# Patient Record
Sex: MALE | Race: WHITE | Employment: FULL TIME | URBAN - METROPOLITAN AREA
[De-identification: names, ages, dates, MRNs, and addresses within clinical notes are randomized per-mention and may not be internally consistent; named-entity substitution may affect disease eponyms.]

---

## 2019-01-09 ENCOUNTER — HOSPITAL ENCOUNTER (EMERGENCY)
Age: 54
Discharge: HOME | End: 2019-01-09
Payer: COMMERCIAL

## 2019-01-09 ENCOUNTER — APPOINTMENT (OUTPATIENT)
Dept: GENERAL RADIOLOGY | Age: 54
End: 2019-01-09
Payer: COMMERCIAL

## 2019-01-09 ENCOUNTER — APPOINTMENT (OUTPATIENT)
Dept: CT IMAGING | Age: 54
End: 2019-01-09
Payer: COMMERCIAL

## 2019-01-09 VITALS
SYSTOLIC BLOOD PRESSURE: 146 MMHG | HEART RATE: 86 BPM | RESPIRATION RATE: 20 BRPM | TEMPERATURE: 98.9 F | DIASTOLIC BLOOD PRESSURE: 94 MMHG | WEIGHT: 221.38 LBS | BODY MASS INDEX: 31.69 KG/M2 | OXYGEN SATURATION: 96 % | HEIGHT: 70 IN

## 2019-01-09 DIAGNOSIS — K59.00 CONSTIPATION, UNSPECIFIED CONSTIPATION TYPE: Primary | ICD-10-CM

## 2019-01-09 LAB
BASOPHILS ABSOLUTE: 0.03 E9/L (ref 0–0.2)
BASOPHILS RELATIVE PERCENT: 0.6 % (ref 0–2)
CO2: 28 MMOL/L (ref 22–29)
EOSINOPHILS ABSOLUTE: 0.17 E9/L (ref 0.05–0.5)
EOSINOPHILS RELATIVE PERCENT: 3.4 % (ref 0–6)
GFR AFRICAN AMERICAN: >60
GFR NON-AFRICAN AMERICAN: >60 ML/MIN/1.73
GLUCOSE BLD-MCNC: 101 MG/DL (ref 74–99)
HCT VFR BLD CALC: 45.2 % (ref 37–54)
HEMOGLOBIN: 15.1 G/DL (ref 12.5–16.5)
IMMATURE GRANULOCYTES #: 0.01 E9/L
IMMATURE GRANULOCYTES %: 0.2 % (ref 0–5)
LYMPHOCYTES ABSOLUTE: 0.72 E9/L (ref 1.5–4)
LYMPHOCYTES RELATIVE PERCENT: 14.4 % (ref 20–42)
MCH RBC QN AUTO: 31.6 PG (ref 26–35)
MCHC RBC AUTO-ENTMCNC: 33.4 % (ref 32–34.5)
MCV RBC AUTO: 94.6 FL (ref 80–99.9)
MONOCYTES ABSOLUTE: 0.65 E9/L (ref 0.1–0.95)
MONOCYTES RELATIVE PERCENT: 13 % (ref 2–12)
NEUTROPHILS ABSOLUTE: 3.42 E9/L (ref 1.8–7.3)
NEUTROPHILS RELATIVE PERCENT: 68.4 % (ref 43–80)
PDW BLD-RTO: 12.8 FL (ref 11.5–15)
PLATELET # BLD: 176 E9/L (ref 130–450)
PMV BLD AUTO: 10.2 FL (ref 7–12)
POC ANION GAP: 13 MMOL/L (ref 7–16)
POC BUN: 10 MG/DL (ref 8–23)
POC CHLORIDE: 98 MMOL/L (ref 100–108)
POC CREATININE: 0.9 MG/DL (ref 0.7–1.2)
POC POTASSIUM: 4 MMOL/L (ref 3.5–5)
POC SODIUM: 139 MMOL/L (ref 132–146)
RBC # BLD: 4.78 E12/L (ref 3.8–5.8)
WBC # BLD: 5 E9/L (ref 4.5–11.5)

## 2019-01-09 PROCEDURE — 6360000004 HC RX CONTRAST MEDICATION: Performed by: RADIOLOGY

## 2019-01-09 PROCEDURE — 82565 ASSAY OF CREATININE: CPT

## 2019-01-09 PROCEDURE — 74018 RADEX ABDOMEN 1 VIEW: CPT

## 2019-01-09 PROCEDURE — 80051 ELECTROLYTE PANEL: CPT

## 2019-01-09 PROCEDURE — 36415 COLL VENOUS BLD VENIPUNCTURE: CPT

## 2019-01-09 PROCEDURE — 84520 ASSAY OF UREA NITROGEN: CPT

## 2019-01-09 PROCEDURE — 99284 EMERGENCY DEPT VISIT MOD MDM: CPT

## 2019-01-09 PROCEDURE — 85025 COMPLETE CBC W/AUTO DIFF WBC: CPT

## 2019-01-09 PROCEDURE — 74177 CT ABD & PELVIS W/CONTRAST: CPT

## 2019-01-09 PROCEDURE — 82947 ASSAY GLUCOSE BLOOD QUANT: CPT

## 2019-01-09 RX ORDER — DOCUSATE SODIUM 100 MG/1
100 CAPSULE, LIQUID FILLED ORAL 2 TIMES DAILY
Qty: 28 CAPSULE | Refills: 0 | Status: SHIPPED | OUTPATIENT
Start: 2019-01-09 | End: 2019-01-23

## 2019-01-09 RX ORDER — MAGNESIUM CARB/ALUMINUM HYDROX 105-160MG
TABLET,CHEWABLE ORAL
Qty: 1 BOTTLE | Refills: 0 | Status: ON HOLD | OUTPATIENT
Start: 2019-01-09 | End: 2021-01-20 | Stop reason: HOSPADM

## 2019-01-09 RX ADMIN — IOHEXOL 50 ML: 240 INJECTION, SOLUTION INTRATHECAL; INTRAVASCULAR; INTRAVENOUS; ORAL at 17:12

## 2019-01-09 RX ADMIN — IOPAMIDOL 80 ML: 755 INJECTION, SOLUTION INTRAVENOUS at 17:12

## 2019-01-09 ASSESSMENT — PAIN DESCRIPTION - ORIENTATION: ORIENTATION: MID

## 2021-01-17 ENCOUNTER — APPOINTMENT (OUTPATIENT)
Dept: CT IMAGING | Age: 56
DRG: 177 | End: 2021-01-17
Payer: COMMERCIAL

## 2021-01-17 ENCOUNTER — HOSPITAL ENCOUNTER (INPATIENT)
Age: 56
LOS: 3 days | Discharge: HOME | DRG: 177 | End: 2021-01-20
Attending: EMERGENCY MEDICINE | Admitting: INTERNAL MEDICINE
Payer: COMMERCIAL

## 2021-01-17 DIAGNOSIS — J96.01 ACUTE HYPOXEMIC RESPIRATORY FAILURE DUE TO COVID-19 (HCC): Primary | ICD-10-CM

## 2021-01-17 DIAGNOSIS — U07.1 ACUTE HYPOXEMIC RESPIRATORY FAILURE DUE TO COVID-19 (HCC): Primary | ICD-10-CM

## 2021-01-17 PROBLEM — J12.82 PNEUMONIA DUE TO COVID-19 VIRUS: Status: ACTIVE | Noted: 2021-01-17

## 2021-01-17 LAB
ALBUMIN SERPL-MCNC: 4.1 G/DL (ref 3.5–5.2)
ALP BLD-CCNC: 62 U/L (ref 40–129)
ALT SERPL-CCNC: 121 U/L (ref 0–40)
ANION GAP SERPL CALCULATED.3IONS-SCNC: 9 MMOL/L (ref 7–16)
APTT: 30.9 SEC (ref 24.5–35.1)
AST SERPL-CCNC: 109 U/L (ref 0–39)
B.E.: -2.1 MMOL/L (ref -3–3)
BASOPHILS ABSOLUTE: 0.01 E9/L (ref 0–0.2)
BASOPHILS RELATIVE PERCENT: 0.2 % (ref 0–2)
BILIRUB SERPL-MCNC: 0.4 MG/DL (ref 0–1.2)
BUN BLDV-MCNC: 10 MG/DL (ref 6–20)
CALCIUM SERPL-MCNC: 8.3 MG/DL (ref 8.6–10.2)
CHLORIDE BLD-SCNC: 98 MMOL/L (ref 98–107)
CO2: 26 MMOL/L (ref 22–29)
COHB: 0.2 % (ref 0–1.5)
CREAT SERPL-MCNC: 0.9 MG/DL (ref 0.7–1.2)
CRITICAL: NORMAL
DATE ANALYZED: NORMAL
DATE OF COLLECTION: NORMAL
EOSINOPHILS ABSOLUTE: 0.01 E9/L (ref 0.05–0.5)
EOSINOPHILS RELATIVE PERCENT: 0.2 % (ref 0–6)
GFR AFRICAN AMERICAN: >60
GFR NON-AFRICAN AMERICAN: >60 ML/MIN/1.73
GLUCOSE BLD-MCNC: 97 MG/DL (ref 74–99)
HCO3: 22.8 MMOL/L (ref 22–26)
HCT VFR BLD CALC: 46 % (ref 37–54)
HEMOGLOBIN: 15.5 G/DL (ref 12.5–16.5)
HHB: 3 % (ref 0–5)
IMMATURE GRANULOCYTES #: 0 E9/L
IMMATURE GRANULOCYTES %: 0 % (ref 0–5)
INR BLD: 0.9
LAB: NORMAL
LACTIC ACID, SEPSIS: 0.7 MMOL/L (ref 0.5–1.9)
LYMPHOCYTES ABSOLUTE: 1.1 E9/L (ref 1.5–4)
LYMPHOCYTES RELATIVE PERCENT: 27.4 % (ref 20–42)
Lab: NORMAL
MCH RBC QN AUTO: 31.9 PG (ref 26–35)
MCHC RBC AUTO-ENTMCNC: 33.7 % (ref 32–34.5)
MCV RBC AUTO: 94.7 FL (ref 80–99.9)
METHB: 0.4 % (ref 0–1.5)
MODE: NORMAL
MONOCYTES ABSOLUTE: 0.49 E9/L (ref 0.1–0.95)
MONOCYTES RELATIVE PERCENT: 12.2 % (ref 2–12)
NEUTROPHILS ABSOLUTE: 2.41 E9/L (ref 1.8–7.3)
NEUTROPHILS RELATIVE PERCENT: 60 % (ref 43–80)
O2 CONTENT: 20.1 ML/DL
O2 SATURATION: 97 % (ref 92–98.5)
O2HB: 96.4 % (ref 94–97)
OPERATOR ID: NORMAL
PATIENT TEMP: 37 C
PCO2: 39.6 MMHG (ref 35–45)
PDW BLD-RTO: 13 FL (ref 11.5–15)
PH BLOOD GAS: 7.38 (ref 7.35–7.45)
PLATELET # BLD: 148 E9/L (ref 130–450)
PMV BLD AUTO: 10.2 FL (ref 7–12)
PO2: 99.8 MMHG (ref 75–100)
POTASSIUM REFLEX MAGNESIUM: 3.9 MMOL/L (ref 3.5–5)
PRO-BNP: 19 PG/ML (ref 0–125)
PROTHROMBIN TIME: 10.7 SEC (ref 9.3–12.4)
RBC # BLD: 4.86 E12/L (ref 3.8–5.8)
SARS-COV-2, NAAT: DETECTED
SODIUM BLD-SCNC: 133 MMOL/L (ref 132–146)
SOURCE, BLOOD GAS: NORMAL
THB: 14.8 G/DL (ref 11.5–16.5)
TIME ANALYZED: 1950
TOTAL PROTEIN: 7.4 G/DL (ref 6.4–8.3)
TROPONIN: <0.01 NG/ML (ref 0–0.03)
WBC # BLD: 4 E9/L (ref 4.5–11.5)

## 2021-01-17 PROCEDURE — 99285 EMERGENCY DEPT VISIT HI MDM: CPT

## 2021-01-17 PROCEDURE — 87040 BLOOD CULTURE FOR BACTERIA: CPT

## 2021-01-17 PROCEDURE — 71275 CT ANGIOGRAPHY CHEST: CPT

## 2021-01-17 PROCEDURE — 2580000003 HC RX 258: Performed by: INTERNAL MEDICINE

## 2021-01-17 PROCEDURE — 85025 COMPLETE CBC W/AUTO DIFF WBC: CPT

## 2021-01-17 PROCEDURE — 83605 ASSAY OF LACTIC ACID: CPT

## 2021-01-17 PROCEDURE — 2500000003 HC RX 250 WO HCPCS: Performed by: EMERGENCY MEDICINE

## 2021-01-17 PROCEDURE — 84484 ASSAY OF TROPONIN QUANT: CPT

## 2021-01-17 PROCEDURE — 82805 BLOOD GASES W/O2 SATURATION: CPT

## 2021-01-17 PROCEDURE — 80053 COMPREHEN METABOLIC PANEL: CPT

## 2021-01-17 PROCEDURE — 6360000002 HC RX W HCPCS: Performed by: INTERNAL MEDICINE

## 2021-01-17 PROCEDURE — 6360000004 HC RX CONTRAST MEDICATION: Performed by: RADIOLOGY

## 2021-01-17 PROCEDURE — 2580000003 HC RX 258: Performed by: EMERGENCY MEDICINE

## 2021-01-17 PROCEDURE — 6360000002 HC RX W HCPCS: Performed by: EMERGENCY MEDICINE

## 2021-01-17 PROCEDURE — 6370000000 HC RX 637 (ALT 250 FOR IP): Performed by: EMERGENCY MEDICINE

## 2021-01-17 PROCEDURE — 99223 1ST HOSP IP/OBS HIGH 75: CPT | Performed by: INTERNAL MEDICINE

## 2021-01-17 PROCEDURE — 36415 COLL VENOUS BLD VENIPUNCTURE: CPT

## 2021-01-17 PROCEDURE — 85610 PROTHROMBIN TIME: CPT

## 2021-01-17 PROCEDURE — 83880 ASSAY OF NATRIURETIC PEPTIDE: CPT

## 2021-01-17 PROCEDURE — 93005 ELECTROCARDIOGRAM TRACING: CPT | Performed by: EMERGENCY MEDICINE

## 2021-01-17 PROCEDURE — 85730 THROMBOPLASTIN TIME PARTIAL: CPT

## 2021-01-17 PROCEDURE — 6370000000 HC RX 637 (ALT 250 FOR IP): Performed by: INTERNAL MEDICINE

## 2021-01-17 PROCEDURE — U0002 COVID-19 LAB TEST NON-CDC: HCPCS

## 2021-01-17 PROCEDURE — XW033E5 INTRODUCTION OF REMDESIVIR ANTI-INFECTIVE INTO PERIPHERAL VEIN, PERCUTANEOUS APPROACH, NEW TECHNOLOGY GROUP 5: ICD-10-PCS | Performed by: INTERNAL MEDICINE

## 2021-01-17 PROCEDURE — 2060000000 HC ICU INTERMEDIATE R&B

## 2021-01-17 PROCEDURE — 84145 PROCALCITONIN (PCT): CPT

## 2021-01-17 RX ORDER — SODIUM CHLORIDE 0.9 % (FLUSH) 0.9 %
10 SYRINGE (ML) INJECTION EVERY 12 HOURS SCHEDULED
Status: DISCONTINUED | OUTPATIENT
Start: 2021-01-17 | End: 2021-01-17 | Stop reason: SDUPTHER

## 2021-01-17 RX ORDER — ACETAMINOPHEN 325 MG/1
650 TABLET ORAL EVERY 6 HOURS PRN
Status: DISCONTINUED | OUTPATIENT
Start: 2021-01-17 | End: 2021-01-20 | Stop reason: HOSPADM

## 2021-01-17 RX ORDER — 0.9 % SODIUM CHLORIDE 0.9 %
30 INTRAVENOUS SOLUTION INTRAVENOUS PRN
Status: DISCONTINUED | OUTPATIENT
Start: 2021-01-17 | End: 2021-01-20 | Stop reason: HOSPADM

## 2021-01-17 RX ORDER — FAMOTIDINE 20 MG/1
20 TABLET, FILM COATED ORAL 2 TIMES DAILY
Status: DISCONTINUED | OUTPATIENT
Start: 2021-01-17 | End: 2021-01-20 | Stop reason: HOSPADM

## 2021-01-17 RX ORDER — SODIUM CHLORIDE 0.9 % (FLUSH) 0.9 %
10 SYRINGE (ML) INJECTION PRN
Status: DISCONTINUED | OUTPATIENT
Start: 2021-01-17 | End: 2021-01-17 | Stop reason: SDUPTHER

## 2021-01-17 RX ORDER — 0.9 % SODIUM CHLORIDE 0.9 %
1000 INTRAVENOUS SOLUTION INTRAVENOUS ONCE
Status: COMPLETED | OUTPATIENT
Start: 2021-01-17 | End: 2021-01-17

## 2021-01-17 RX ORDER — ACETAMINOPHEN 500 MG
1000 TABLET ORAL ONCE
Status: COMPLETED | OUTPATIENT
Start: 2021-01-17 | End: 2021-01-17

## 2021-01-17 RX ORDER — METHYLPREDNISOLONE SODIUM SUCCINATE 125 MG/2ML
125 INJECTION, POWDER, LYOPHILIZED, FOR SOLUTION INTRAMUSCULAR; INTRAVENOUS
Status: COMPLETED | OUTPATIENT
Start: 2021-01-17 | End: 2021-01-17

## 2021-01-17 RX ORDER — DEXAMETHASONE SODIUM PHOSPHATE 10 MG/ML
6 INJECTION, SOLUTION INTRAMUSCULAR; INTRAVENOUS ONCE
Status: COMPLETED | OUTPATIENT
Start: 2021-01-17 | End: 2021-01-17

## 2021-01-17 RX ORDER — SODIUM CHLORIDE 0.9 % (FLUSH) 0.9 %
10 SYRINGE (ML) INJECTION EVERY 12 HOURS SCHEDULED
Status: DISCONTINUED | OUTPATIENT
Start: 2021-01-17 | End: 2021-01-20 | Stop reason: HOSPADM

## 2021-01-17 RX ORDER — PROMETHAZINE HYDROCHLORIDE 25 MG/1
12.5 TABLET ORAL EVERY 6 HOURS PRN
Status: DISCONTINUED | OUTPATIENT
Start: 2021-01-17 | End: 2021-01-20 | Stop reason: HOSPADM

## 2021-01-17 RX ORDER — ACETAMINOPHEN 325 MG/1
650 TABLET ORAL ONCE
Status: COMPLETED | OUTPATIENT
Start: 2021-01-17 | End: 2021-01-17

## 2021-01-17 RX ORDER — EPINEPHRINE 1 MG/ML
0.3 INJECTION, SOLUTION, CONCENTRATE INTRAVENOUS
Status: ACTIVE | OUTPATIENT
Start: 2021-01-17 | End: 2021-01-17

## 2021-01-17 RX ORDER — POLYETHYLENE GLYCOL 3350 17 G/17G
17 POWDER, FOR SOLUTION ORAL DAILY PRN
Status: DISCONTINUED | OUTPATIENT
Start: 2021-01-17 | End: 2021-01-20 | Stop reason: HOSPADM

## 2021-01-17 RX ORDER — ACETAMINOPHEN 650 MG/1
650 SUPPOSITORY RECTAL EVERY 6 HOURS PRN
Status: DISCONTINUED | OUTPATIENT
Start: 2021-01-17 | End: 2021-01-20 | Stop reason: HOSPADM

## 2021-01-17 RX ORDER — DIPHENHYDRAMINE HYDROCHLORIDE 50 MG/ML
25 INJECTION INTRAMUSCULAR; INTRAVENOUS ONCE
Status: COMPLETED | OUTPATIENT
Start: 2021-01-17 | End: 2021-01-17

## 2021-01-17 RX ORDER — SODIUM CHLORIDE 0.9 % (FLUSH) 0.9 %
10 SYRINGE (ML) INJECTION PRN
Status: DISCONTINUED | OUTPATIENT
Start: 2021-01-17 | End: 2021-01-20 | Stop reason: HOSPADM

## 2021-01-17 RX ORDER — ONDANSETRON 2 MG/ML
4 INJECTION INTRAMUSCULAR; INTRAVENOUS EVERY 6 HOURS PRN
Status: DISCONTINUED | OUTPATIENT
Start: 2021-01-17 | End: 2021-01-20 | Stop reason: HOSPADM

## 2021-01-17 RX ADMIN — Medication 10 ML: at 23:46

## 2021-01-17 RX ADMIN — FAMOTIDINE 20 MG: 20 TABLET, FILM COATED ORAL at 23:30

## 2021-01-17 RX ADMIN — DEXAMETHASONE SODIUM PHOSPHATE 6 MG: 10 INJECTION, SOLUTION INTRAMUSCULAR; INTRAVENOUS at 20:34

## 2021-01-17 RX ADMIN — SODIUM CHLORIDE 1000 ML: 9 INJECTION, SOLUTION INTRAVENOUS at 18:04

## 2021-01-17 RX ADMIN — DIPHENHYDRAMINE HYDROCHLORIDE 25 MG: 50 INJECTION INTRAMUSCULAR; INTRAVENOUS at 23:26

## 2021-01-17 RX ADMIN — ACETAMINOPHEN 1000 MG: 500 TABLET, FILM COATED ORAL at 18:04

## 2021-01-17 RX ADMIN — REMDESIVIR 200 MG: 100 INJECTION, POWDER, LYOPHILIZED, FOR SOLUTION INTRAVENOUS at 20:34

## 2021-01-17 RX ADMIN — POLYETHYLENE GLYCOL 3350 17 G: 17 POWDER, FOR SOLUTION ORAL at 23:52

## 2021-01-17 RX ADMIN — TOCILIZUMAB 800 MG: 20 INJECTION, SOLUTION, CONCENTRATE INTRAVENOUS at 23:45

## 2021-01-17 RX ADMIN — ACETAMINOPHEN 650 MG: 325 TABLET, FILM COATED ORAL at 23:26

## 2021-01-17 RX ADMIN — IOPAMIDOL 75 ML: 755 INJECTION, SOLUTION INTRAVENOUS at 18:24

## 2021-01-17 RX ADMIN — METHYLPREDNISOLONE SODIUM SUCCINATE 125 MG: 125 INJECTION, POWDER, FOR SOLUTION INTRAMUSCULAR; INTRAVENOUS at 23:30

## 2021-01-17 ASSESSMENT — ENCOUNTER SYMPTOMS
WHEEZING: 0
BACK PAIN: 0
BLOOD IN STOOL: 0
DIARRHEA: 0
EYE REDNESS: 0
SHORTNESS OF BREATH: 1
VOMITING: 0
CONSTIPATION: 0
SORE THROAT: 0
COUGH: 1
RHINORRHEA: 0
ABDOMINAL PAIN: 0
NAUSEA: 0
ABDOMINAL DISTENTION: 0

## 2021-01-17 ASSESSMENT — PAIN SCALES - GENERAL: PAINLEVEL_OUTOF10: 0

## 2021-01-18 LAB
ALBUMIN SERPL-MCNC: 3.9 G/DL (ref 3.5–5.2)
ALP BLD-CCNC: 58 U/L (ref 40–129)
ALT SERPL-CCNC: 109 U/L (ref 0–40)
ANION GAP SERPL CALCULATED.3IONS-SCNC: 10 MMOL/L (ref 7–16)
AST SERPL-CCNC: 92 U/L (ref 0–39)
BASOPHILS ABSOLUTE: 0 E9/L (ref 0–0.2)
BASOPHILS RELATIVE PERCENT: 0 % (ref 0–2)
BILIRUB SERPL-MCNC: 0.3 MG/DL (ref 0–1.2)
BUN BLDV-MCNC: 12 MG/DL (ref 6–20)
CALCIUM SERPL-MCNC: 8.3 MG/DL (ref 8.6–10.2)
CHLORIDE BLD-SCNC: 103 MMOL/L (ref 98–107)
CO2: 22 MMOL/L (ref 22–29)
CREAT SERPL-MCNC: 0.7 MG/DL (ref 0.7–1.2)
EKG ATRIAL RATE: 81 BPM
EKG P AXIS: 53 DEGREES
EKG P-R INTERVAL: 146 MS
EKG Q-T INTERVAL: 358 MS
EKG QRS DURATION: 94 MS
EKG QTC CALCULATION (BAZETT): 415 MS
EKG R AXIS: 28 DEGREES
EKG T AXIS: 45 DEGREES
EKG VENTRICULAR RATE: 81 BPM
EOSINOPHILS ABSOLUTE: 0 E9/L (ref 0.05–0.5)
EOSINOPHILS RELATIVE PERCENT: 0 % (ref 0–6)
GFR AFRICAN AMERICAN: >60
GFR NON-AFRICAN AMERICAN: >60 ML/MIN/1.73
GLUCOSE BLD-MCNC: 158 MG/DL (ref 74–99)
HCT VFR BLD CALC: 46.5 % (ref 37–54)
HEMOGLOBIN: 15.7 G/DL (ref 12.5–16.5)
IMMATURE GRANULOCYTES #: 0 E9/L
IMMATURE GRANULOCYTES %: 0 % (ref 0–5)
LACTIC ACID, SEPSIS: 1.1 MMOL/L (ref 0.5–1.9)
LYMPHOCYTES ABSOLUTE: 0.6 E9/L (ref 1.5–4)
LYMPHOCYTES RELATIVE PERCENT: 23.7 % (ref 20–42)
MCH RBC QN AUTO: 31.6 PG (ref 26–35)
MCHC RBC AUTO-ENTMCNC: 33.8 % (ref 32–34.5)
MCV RBC AUTO: 93.6 FL (ref 80–99.9)
MONOCYTES ABSOLUTE: 0.11 E9/L (ref 0.1–0.95)
MONOCYTES RELATIVE PERCENT: 4.3 % (ref 2–12)
NEUTROPHILS ABSOLUTE: 1.82 E9/L (ref 1.8–7.3)
NEUTROPHILS RELATIVE PERCENT: 72 % (ref 43–80)
PDW BLD-RTO: 12.9 FL (ref 11.5–15)
PLATELET # BLD: 157 E9/L (ref 130–450)
PMV BLD AUTO: 10.8 FL (ref 7–12)
POTASSIUM REFLEX MAGNESIUM: 4.4 MMOL/L (ref 3.5–5)
RBC # BLD: 4.97 E12/L (ref 3.8–5.8)
SARS-COV-2 ANTIBODY, TOTAL: NORMAL
SODIUM BLD-SCNC: 135 MMOL/L (ref 132–146)
TOTAL PROTEIN: 7.2 G/DL (ref 6.4–8.3)
WBC # BLD: 2.5 E9/L (ref 4.5–11.5)

## 2021-01-18 PROCEDURE — 93010 ELECTROCARDIOGRAM REPORT: CPT | Performed by: INTERNAL MEDICINE

## 2021-01-18 PROCEDURE — 6370000000 HC RX 637 (ALT 250 FOR IP): Performed by: INTERNAL MEDICINE

## 2021-01-18 PROCEDURE — 2500000003 HC RX 250 WO HCPCS: Performed by: INTERNAL MEDICINE

## 2021-01-18 PROCEDURE — 2700000000 HC OXYGEN THERAPY PER DAY

## 2021-01-18 PROCEDURE — 83605 ASSAY OF LACTIC ACID: CPT

## 2021-01-18 PROCEDURE — 6360000002 HC RX W HCPCS: Performed by: INTERNAL MEDICINE

## 2021-01-18 PROCEDURE — 80053 COMPREHEN METABOLIC PANEL: CPT

## 2021-01-18 PROCEDURE — 36415 COLL VENOUS BLD VENIPUNCTURE: CPT

## 2021-01-18 PROCEDURE — 86769 SARS-COV-2 COVID-19 ANTIBODY: CPT

## 2021-01-18 PROCEDURE — 2060000000 HC ICU INTERMEDIATE R&B

## 2021-01-18 PROCEDURE — 85025 COMPLETE CBC W/AUTO DIFF WBC: CPT

## 2021-01-18 PROCEDURE — 2580000003 HC RX 258: Performed by: INTERNAL MEDICINE

## 2021-01-18 PROCEDURE — 99232 SBSQ HOSP IP/OBS MODERATE 35: CPT | Performed by: INTERNAL MEDICINE

## 2021-01-18 PROCEDURE — 6370000000 HC RX 637 (ALT 250 FOR IP): Performed by: SPECIALIST

## 2021-01-18 RX ORDER — LANOLIN ALCOHOL/MO/W.PET/CERES
50 CREAM (GRAM) TOPICAL DAILY
Status: DISCONTINUED | OUTPATIENT
Start: 2021-01-18 | End: 2021-01-20 | Stop reason: HOSPADM

## 2021-01-18 RX ORDER — ASCORBIC ACID 500 MG
1000 TABLET ORAL DAILY
Status: DISCONTINUED | OUTPATIENT
Start: 2021-01-18 | End: 2021-01-20 | Stop reason: HOSPADM

## 2021-01-18 RX ORDER — VITAMIN B COMPLEX
3000 TABLET ORAL DAILY
Status: DISCONTINUED | OUTPATIENT
Start: 2021-01-18 | End: 2021-01-20 | Stop reason: HOSPADM

## 2021-01-18 RX ORDER — ZINC SULFATE 50(220)MG
50 CAPSULE ORAL DAILY
Status: DISCONTINUED | OUTPATIENT
Start: 2021-01-18 | End: 2021-01-20 | Stop reason: HOSPADM

## 2021-01-18 RX ORDER — GAUZE BANDAGE 2" X 2"
100 BANDAGE TOPICAL DAILY
Status: DISCONTINUED | OUTPATIENT
Start: 2021-01-18 | End: 2021-01-20 | Stop reason: HOSPADM

## 2021-01-18 RX ORDER — MECOBALAMIN 5000 MCG
5 TABLET,DISINTEGRATING ORAL NIGHTLY
Status: DISCONTINUED | OUTPATIENT
Start: 2021-01-18 | End: 2021-01-20 | Stop reason: HOSPADM

## 2021-01-18 RX ADMIN — ENOXAPARIN SODIUM 40 MG: 40 INJECTION SUBCUTANEOUS at 08:36

## 2021-01-18 RX ADMIN — DEXAMETHASONE 6 MG: 4 TABLET ORAL at 08:36

## 2021-01-18 RX ADMIN — REMDESIVIR 100 MG: 100 INJECTION, POWDER, LYOPHILIZED, FOR SOLUTION INTRAVENOUS at 20:09

## 2021-01-18 RX ADMIN — PYRIDOXINE HCL TAB 50 MG 50 MG: 50 TAB at 08:37

## 2021-01-18 RX ADMIN — ZINC SULFATE 220 MG (50 MG) CAPSULE 50 MG: CAPSULE at 08:36

## 2021-01-18 RX ADMIN — Medication 1000 MG: at 08:36

## 2021-01-18 RX ADMIN — Medication 10 ML: at 08:36

## 2021-01-18 RX ADMIN — FAMOTIDINE 20 MG: 20 TABLET, FILM COATED ORAL at 21:18

## 2021-01-18 RX ADMIN — THIAMINE HCL TAB 100 MG 100 MG: 100 TAB at 08:36

## 2021-01-18 RX ADMIN — POLYETHYLENE GLYCOL 3350 17 G: 17 POWDER, FOR SOLUTION ORAL at 08:36

## 2021-01-18 RX ADMIN — Medication 5 MG: at 21:18

## 2021-01-18 RX ADMIN — SODIUM CHLORIDE 30 ML: 9 INJECTION, SOLUTION INTRAVENOUS at 20:50

## 2021-01-18 RX ADMIN — Medication 3000 UNITS: at 08:36

## 2021-01-18 RX ADMIN — SODIUM CHLORIDE, PRESERVATIVE FREE 10 ML: 5 INJECTION INTRAVENOUS at 21:18

## 2021-01-18 RX ADMIN — Medication 10 ML: at 20:09

## 2021-01-18 RX ADMIN — FAMOTIDINE 20 MG: 20 TABLET, FILM COATED ORAL at 08:36

## 2021-01-18 ASSESSMENT — PAIN SCALES - GENERAL: PAINLEVEL_OUTOF10: 0

## 2021-01-18 NOTE — ACP (ADVANCE CARE PLANNING)
Advance Care Planning     Advance Care Planning Activator (Inpatient)  Conversation Note      Date of ACP Conversation: 1/18/2021  Conversation Conducted with: Patient  ACP Activator: Tianna Brownlee RN    *When Decision Maker makes decisions on behalf of the incapacitated patient: Decision Maker is asked to consider and make decisions based on patient values, known preferences, or best interests. Health Care Decision Maker:     Current Designated Health Care Decision Maker:   Primary Decision Maker: [facility] CHILO - Domestic Partner - [phone]    Secondary Decision Maker: Aicha Bocanegra - Brother/Sister - [phone]    Note: If the relationship of these Decision-Makers to the patient does NOT follow your state's Next of Kin hierarchy, recommend that patient complete ACP document that meets state-specific requirements to allow them to act on the patient's behalf when appropriate. Care Preferences    Ventilation: \"If you were in your present state of health and suddenly became very ill and were unable to breathe on your own, what would your preference be about the use of a ventilator (breathing machine) if it were available to you? \"      Would the patient desire the use of ventilator (breathing machine)?: yes    \"If your health worsens and it becomes clear that your chance of recovery is unlikely, what would your preference be about the use of a ventilator (breathing machine) if it were available to you? \"     Would the patient desire the use of ventilator (breathing machine)?: No      Resuscitation  \"CPR works best to restart the heart when there is a sudden event, like a heart attack, in someone who is otherwise healthy. Unfortunately, CPR does not typically restart the heart for people who have serious health conditions or who are very sick. \"    \"In the event your heart stopped as a result of an underlying serious health condition, would you want attempts to be made to restart your heart (answer \"yes\" for attempt to resuscitate) or would you prefer a natural death (answer \"no\" for do not attempt to resuscitate)? \" yes - Mr. Gonzáles November states \"one attempt\"      NOTE: If the patient has a valid advance directive AND now provides care preference(s) that are inconsistent with that prior directive, advise the patient to consider either: creating a new advance directive that complies with state-specific requirements; or, if that is not possible, orally revoking that prior directive in accordance with state-specific requirements, which must be documented in the EHR. [] Yes   [x] No   Educated Patient / Doc Gist regarding differences between Advance Directives and portable DNR orders.     Length of ACP Conversation in minutes:      Conversation Outcomes:  [x] ACP discussion completed  [] Existing advance directive reviewed with patient; no changes to patient's previously recorded wishes  [] New Advance Directive completed  [] Portable Do Not Rescitate prepared for Provider review and signature  [] POLST/POST/MOLST/MOST prepared for Provider review and signature      Follow-up plan:    [] Schedule follow-up conversation to continue planning  [] Referred individual to Provider for additional questions/concerns   [] Advised patient/agent/surrogate to review completed ACP document and update if needed with changes in condition, patient preferences or care setting    [x] This note routed to one or more involved healthcare providers

## 2021-01-18 NOTE — PROGRESS NOTES
Emergency contact Shakira Bruce ([facility]) called and updated on patient status and care at this time. All questions addressed.     Antonieta Diaz RN

## 2021-01-18 NOTE — ED PROVIDER NOTES
ROLA Crawford is a 54 y.o. male with a PMHx significant for no chronic medical problems who presents with several days of worsening cough, fevers, chest pain and shortness of breath. The patient states that his fever has been as high as 101.5 °F.  He states that he is taken ibuprofen for the fevers to keep it under control. He states that his cough has been dry. He describes the chest pain as a generalized pressure sensation overlying the mid to left chest.  He states that it does not radiate anywhere. He states that the shortness of breath has significantly worsened over the last 24 hours specifically. He states that any activity makes it worse. He also states that lying flat makes it worse. Has not noticed anything other than rest to make his symptoms better. Symptoms are moderate in severity and have been persistent. The patient denies recent trauma, HA, dizziness, vision changes, congestion, rhinorrhea, neck pain, palpitations, hx of MI, hx of blood clots, LE edema, hemoptysis, wheezing, abdominal pain, N/V/D/C, hematochezia, melena, dysuria, hematuria, generalized weakness and paresthesias. The patient is currently taking no blood thinners. Tobacco Hx:   reports that he has never smoked. He does not have any smokeless tobacco history on file. Alcohol Hx:   reports current alcohol use of about 10.0 standard drinks of alcohol per week. Illicit Drug Hx:  Reports no history of illicit drug use. The history is provided by the patient. Last Tetanus (if applicable): N/A    Review of Systems   Constitutional: Positive for chills, fatigue and fever. Negative for diaphoresis. HENT: Negative for congestion, rhinorrhea and sore throat. Eyes: Negative for redness. Respiratory: Positive for cough (Nonproductive) and shortness of breath. Negative for wheezing. Cardiovascular: Positive for chest pain. Negative for palpitations and leg swelling.    Gastrointestinal: Negative for abdominal distention, abdominal pain, blood in stool, constipation, diarrhea, nausea and vomiting. Genitourinary: Negative for difficulty urinating, dysuria, flank pain, frequency and hematuria. Musculoskeletal: Negative for arthralgias, back pain, myalgias and neck pain. Skin: Negative for rash and wound. Neurological: Negative for dizziness, syncope, speech difficulty, weakness, light-headedness, numbness and headaches. Physical Exam  Vitals signs and nursing note reviewed. Constitutional:       General: He is awake. He is not in acute distress. Appearance: He is not diaphoretic. HENT:      Head: Normocephalic and atraumatic. Right Ear: External ear normal.      Left Ear: External ear normal.      Nose: Nose normal. No congestion or rhinorrhea. Mouth/Throat:      Mouth: Mucous membranes are dry. Pharynx: Oropharynx is clear. Eyes:      General: No scleral icterus. Right eye: No discharge. Left eye: No discharge. Extraocular Movements: Extraocular movements intact. Conjunctiva/sclera: Conjunctivae normal.   Neck:      Musculoskeletal: Normal range of motion and neck supple. No neck rigidity or muscular tenderness. Cardiovascular:      Rate and Rhythm: Regular rhythm. Tachycardia present. Heart sounds: Normal heart sounds. No murmur. No friction rub. No gallop. Comments: Upper extremity and lower extremity distal pulses intact bilaterally +2/4  Pulmonary:      Breath sounds: Rales present. No wheezing or rhonchi. Comments: Patient is tachypneic. Decreased air movement noted throughout. Bilateral crackles on auscultation. Chest:      Chest wall: Tenderness (To mild palpation) present. Abdominal:      General: Bowel sounds are normal. There is no distension. Palpations: Abdomen is soft. Tenderness: There is no abdominal tenderness. There is no guarding or rebound. Musculoskeletal: Normal range of motion. General: No tenderness or deformity. Right lower leg: No edema. Left lower leg: No edema. Lymphadenopathy:      Cervical: No cervical adenopathy. Skin:     General: Skin is warm and dry. Capillary Refill: Capillary refill takes less than 2 seconds. Findings: No erythema or rash. Neurological:      General: No focal deficit present. Mental Status: He is alert and oriented to person, place, and time. Sensory: No sensory deficit. Motor: No weakness. Coordination: Coordination normal.          --------------------------------------------- PAST HISTORY ---------------------------------------------  Past Medical History:  has no past medical history on file. Past Surgical History:  has a past surgical history that includes eye surgery. Social History:  reports that he has never smoked. He does not have any smokeless tobacco history on file. He reports current alcohol use of about 10.0 standard drinks of alcohol per week. He reports that he does not use drugs. Family History: family history is not on file. Home Meds: Not in a hospital admission. The patients home medications have been reviewed. Allergies: Patient has no known allergies. ------------------------- NURSING NOTES AND VITALS REVIEWED ---------------------------  Date / Time Roomed:  1/17/2021  5:23 PM  ED Bed Assignment:  04/04    The nursing notes within the ED encounter and vital signs as below have been reviewed. /71   Pulse 69   Temp 98.8 °F (37.1 °C)   Resp 22   Ht 5' 10\" (1.778 m)   Wt 230 lb (104.3 kg)   SpO2 98%   BMI 33.00 kg/m²   -------------------------------------------------- RESULTS / INTERVENTIONS -------------------------------------------------  All laboratory and radiology tests have been reviewed by this physician.     LABS:  Results for orders placed or performed during the hospital encounter of 01/17/21   CBC Auto Differential   Result Value Ref Range    WBC 4.0 (L) 4.5 - 11.5 E9/L    RBC 4.86 3.80 - 5.80 E12/L    Hemoglobin 15.5 12.5 - 16.5 g/dL    Hematocrit 46.0 37.0 - 54.0 %    MCV 94.7 80.0 - 99.9 fL    MCH 31.9 26.0 - 35.0 pg    MCHC 33.7 32.0 - 34.5 %    RDW 13.0 11.5 - 15.0 fL    Platelets 857 016 - 733 E9/L    MPV 10.2 7.0 - 12.0 fL    Neutrophils % 60.0 43.0 - 80.0 %    Immature Granulocytes % 0.0 0.0 - 5.0 %    Lymphocytes % 27.4 20.0 - 42.0 %    Monocytes % 12.2 (H) 2.0 - 12.0 %    Eosinophils % 0.2 0.0 - 6.0 %    Basophils % 0.2 0.0 - 2.0 %    Neutrophils Absolute 2.41 1.80 - 7.30 E9/L    Immature Granulocytes # 0.00 E9/L    Lymphocytes Absolute 1.10 (L) 1.50 - 4.00 E9/L    Monocytes Absolute 0.49 0.10 - 0.95 E9/L    Eosinophils Absolute 0.01 (L) 0.05 - 0.50 E9/L    Basophils Absolute 0.01 0.00 - 0.20 E9/L   Comprehensive Metabolic Panel w/ Reflex to MG   Result Value Ref Range    Sodium 133 132 - 146 mmol/L    Potassium reflex Magnesium 3.9 3.5 - 5.0 mmol/L    Chloride 98 98 - 107 mmol/L    CO2 26 22 - 29 mmol/L    Anion Gap 9 7 - 16 mmol/L    Glucose 97 74 - 99 mg/dL    BUN 10 6 - 20 mg/dL    CREATININE 0.9 0.7 - 1.2 mg/dL    GFR Non-African American >60 >=60 mL/min/1.73    GFR African American >60     Calcium 8.3 (L) 8.6 - 10.2 mg/dL    Total Protein 7.4 6.4 - 8.3 g/dL    Alb 4.1 3.5 - 5.2 g/dL    Total Bilirubin 0.4 0.0 - 1.2 mg/dL    Alkaline Phosphatase 62 40 - 129 U/L     (H) 0 - 40 U/L     (H) 0 - 39 U/L   Troponin   Result Value Ref Range    Troponin <0.01 0.00 - 0.03 ng/mL   Brain Natriuretic Peptide   Result Value Ref Range    Pro-BNP 19 0 - 125 pg/mL   Lactate, Sepsis   Result Value Ref Range    Lactic Acid, Sepsis 0.7 0.5 - 1.9 mmol/L   Protime-INR   Result Value Ref Range    Protime 10.7 9.3 - 12.4 sec    INR 0.9    APTT   Result Value Ref Range    aPTT 30.9 24.5 - 35.1 sec   COVID-19   Result Value Ref Range    SARS-CoV-2, NAAT DETECTED (A) Not Detected   Blood Gas, Arterial   Result Value Ref Range    Date Analyzed [id number] Time Analyzed 1950     Source: Blood Arterial     pH, Blood Gas 7.378 7.350 - 7.450    PCO2 39.6 35.0 - 45.0 mmHg    PO2 99.8 75.0 - 100.0 mmHg    HCO3 22.8 22.0 - 26.0 mmol/L    B.E. -2.1 -3.0 - 3.0 mmol/L    O2 Sat 97.0 92.0 - 98.5 %    O2Hb 96.4 94.0 - 97.0 %    COHb 0.2 0.0 - 1.5 %    MetHb 0.4 0.0 - 1.5 %    O2 Content 20.1 mL/dL    HHb 3.0 0.0 - 5.0 %    tHb (est) 14.8 11.5 - 16.5 g/dL    Mode NC- 3 L     Date Of Collection      Time Collected      Pt Temp 37.0 C     ID [id number]     Lab [id number]     Critical(s) Notified . No Critical Values    EKG 12 Lead   Result Value Ref Range    Ventricular Rate 81 BPM    Atrial Rate 81 BPM    P-R Interval 146 ms    QRS Duration 94 ms    Q-T Interval 358 ms    QTc Calculation (Bazett) 415 ms    P Axis 53 degrees    R Axis 28 degrees    T Axis 45 degrees       RADIOLOGY: Interpreted by Radiologist unless otherwise noted. CTA PULMONARY W CONTRAST   Final Result   No evidence of pulmonary embolism. Diffuse and bilateral areas of ground-glass appearance consistent with   pneumonia. COVID pneumonia suspected. Hepatic steatosis. EKG: As interpreted by this ER physician.   Rate: 81 bpm  Rhythm: Sinus  Axis: normal  ST Segments: no acute change  T-Waves: no acute change  Interpretation: NSR; normal EKG  Comparison: no previous EKG    Oxygen Saturation Interpretation: Abnormal    Meds Given:  Medications   sodium chloride flush 0.9 % injection 10 mL (has no administration in time range)   sodium chloride flush 0.9 % injection 10 mL (has no administration in time range)   remdesivir 200 mg in sodium chloride 0.9 % 250 mL IVPB (200 mg Intravenous New Bag 1/17/21 2034)     Followed by   remdesivir 100 mg in sodium chloride 0.9 % 250 mL IVPB (has no administration in time range)   0.9 % sodium chloride bolus (has no administration in time range)   dexamethasone (DECADRON) tablet 6 mg (has no administration in time range)   0.9 % sodium chloride bolus (0 mLs Intravenous Stopped 1/17/21 1930)   acetaminophen (TYLENOL) tablet 1,000 mg (1,000 mg Oral Given 1/17/21 1804)   iopamidol (ISOVUE-370) 76 % injection 75 mL (75 mLs Intravenous Given 1/17/21 1824)   dexamethasone (PF) (DECADRON) injection 6 mg (6 mg Intravenous Given 1/17/21 2034)       Procedures:  No procedures performed. --------------------------------- PROGRESS NOTES / ADDITIONAL PROVIDER NOTES ---------------------------------  Consultations:  As outlined below. ED Course:    ED Course as of Jan 17 2113   Tim Govea Jan 17, 2021   1706 On reevaluation, the patient is resting comfortably in bed. We are still awaiting results of tests at this time. [ML]   1920 Our clinical pharmacist recommends starting the patient on Actemra because he is Covid positive, febrile and requiring supplemental oxygen. Consult placed to infectious disease to discuss. [ML]   1958 The case was discussed with Dr. Saulo Jeong, the physician whom the patient lists as his PCP. Dr. Saulo Jeong states that she has not seen the patient in probably 8 years. She request that we admit the patient to the hospitalist service. [ML]   2030 I discussed the case with Dr. Dany Haas of internal medicine. He agrees to admit the patient for further evaluation and management. [ML]      ED Course User Index  [ML] Bing Smith,        2033: All results were discussed with the patient and I have provided specific details regarding the plan to admit the patient. The patient was stable at the time of admission and was without objective evidence of hemodynamic instability. He was seen in the emergency department by myself and the assigned attending physician, Dr. Toña Carranza, who agreed with the assessment, plan and decision to admit as laid out herein. The patient verbalized an understanding and agreement with the plan for admission. All questions were answered and the patient was deemed to be in stable condition at the time of transport.     MDM:  Patient presented from home complaining of several days of worsening cough, fever, chest pain and shortness of breath. On arrival, the patient was hypertensive, tachycardic, tachypneic and hypoxic with an SPO2 of 84%. The patient was placed on 3 L of oxygen via nasal cannula and improved to the mid 90s where he remained. EKG and physical exam were as documented above. A work-up was initiated to further evaluate the patient's presenting complaints. Labs are remarkable for significantly elevated AST and ALT, leukopenia and lymphopenia. Troponin and proBNP were negative. Rapid Covid was positive. CTA of the chest was negative for a pulmonary embolus, but architectural changes to the lung consistent with Covid pneumonia were noted. Given the patient's new oxygen requirement, the decision was made to admit him to the hospital for further evaluation and management. The case was discussed with internal medicine who agreed to admit. Pharmacy was consulted to dose remdesivir. In addition, in consultation with clinical pharmacy and infectious disease, the decision was made to start the patient on Actemra because he was Covid positive, febrile and hypoxic. Patient was also given Tylenol, Decadron and IV fluid while in the department. The patient was stable at the time of his disposition. This patient's ED course included: a personal history and physicial examination, re-evaluation prior to disposition, multiple bedside re-evaluations, IV medications, cardiac monitoring and continuous pulse oximetry    Diagnosis:  1. Acute hypoxemic respiratory failure due to COVID-19 [facility])        Disposition:  Patient's disposition: Admit to telemetry  Patient's condition is stable. This patient was seen, examined and treated with Dr. Bang Blevins. All pertinent aspects of the patient's care were discussed with the attending physician.        ATTENDING PROVIDER ATTESTATION:     Charan Baker presented to the emergency department for evaluation of

## 2021-01-18 NOTE — PROGRESS NOTES
Admitting Date and Time: 1/17/2021  5:23 PM  Admit Dx: Pneumonia due to COVID-19 virus [U07.1, J12.82]    Subjective:    Pt feels he describes in great detail his presentation \"cough so bad I couldn't breathe\"  Less cough less sob less fever  Per RN: no issues    ROS: denies fever, chills, cp, sob, n/v, HA unless stated above.      melatonin  5 mg Oral Nightly    thiamine mononitrate  100 mg Oral Daily    zinc sulfate  50 mg Oral Daily    ascorbic acid  1,000 mg Oral Daily    vitamin B-6  50 mg Oral Daily    Vitamin D  3,000 Units Oral Daily    remdesivir IVPB  100 mg Intravenous Q24H    dexamethasone  6 mg Oral Daily    sodium chloride flush  10 mL Intravenous 2 times per day    enoxaparin  40 mg Subcutaneous Daily    famotidine  20 mg Oral BID         sodium chloride, 30 mL, PRN      sodium chloride flush, 10 mL, PRN      promethazine, 12.5 mg, Q6H PRN    Or      ondansetron, 4 mg, Q6H PRN      polyethylene glycol, 17 g, Daily PRN      acetaminophen, 650 mg, Q6H PRN    Or      acetaminophen, 650 mg, Q6H PRN         Objective:    /80   Pulse 69   Temp 97.3 °F (36.3 °C) (Infrared)   Resp 18   Ht 5' 10\" (1.778 m)   Wt 230 lb (104.3 kg)   SpO2 93%   BMI 33.00 kg/m²   General Appearance: alert and oriented to person, place and time and in no acute distress  Skin: warm and dry  Head: normocephalic and atraumatic  Eyes: pupils equal, round, and reactive to light, extraocular eye movements intact, conjunctivae normal  Neck: neck supple and non tender without mass   Pulmonary/Chest: clear to auscultation bilaterally- no wheezes, rales or rhonchi, normal air movement, no respiratory distress  Cardiovascular: normal rate, normal S1 and S2 and no carotid bruits  Abdomen: soft, non-tender, non-distended, normal bowel sounds, no masses or organomegaly  Extremities: no cyanosis, no clubbing and no  edema  Neurologic: no cranial nerve deficit and speech normal      Recent Labs     01/17/21  1747   NA ensure accuracy; however, inadvertent computerized transcription errors may be present.      Electronically signed by Domitila Thompson MD on 1/18/2021 at 8:50 AM

## 2021-01-18 NOTE — H&P
[address] [place], [place] [address]ist Group   HISTORY AND PHYSICAL EXAM      AUTHOR: Luna Urbina PATIENT NAME: Fredy Avila   DATE: 2021 MRN: [id number], : 1965   Primary Care Physician: Armando Miranda MD     CHIEF COMPLAINT / REASON FOR ADMISSION:  SOB, Cough    HPI:   This is a 54 y.o. male  has no past medical history on file. presented with SOB and cough for last few days prior to arrival to the hospital. SOB is associated with some cough, nonproductive and subjective fever and chills. Initially SOB was mild, intermittent but gradually getting more persistent. Started gradually. Exacerbated by general activity or exertion. Relieved only partially by rest. In ED patient COVID test is positive with bilateral covid pneumonia. The patient was seen and examined at bedside, appears alert and awake with mild respiratory distress and is able to answer simple  questions. On direct questioning, patient denied any  resting ongoing chest pain, resting SOB, hemoptysis, ongoing palpitation, active abdominal pain, hematemesis, rectal bleeding, petty, hematuria, any other  and GI complaints and any new focal neuro deficits . ROS:  Pertinent positives and negatives are noted in the HPI, all other systems are reviewed and negative    PMH:  No past medical history on file. Surgical History:  Past Surgical History:   Procedure Laterality Date    EYE SURGERY         Medications Prior to Admission:    Prior to Admission medications    Medication Sig Start Date End Date Taking? Authorizing Provider   Magnesium Citrate 1.745 GM/30ML solution Take 150 ml by mouth x1 when necessary for constipation. May repeat this x1 one again at no results in 4 hours. Dispense QS, no refills 19   Galilea Oakley, APRN - CNP       Allergies:    Patient has no known allergies. Social History:    reports that he has never smoked. He does not have any smokeless tobacco history on file.  He reports current alcohol use of about 10.0 standard drinks of alcohol per week. He reports that he does not use drugs. Family History:   family history is not on file. PHYSICAL EXAM:  Vitals:  /81   Pulse 73   Temp 100.2 °F (37.9 °C) (Oral)   Resp 21   Ht 5' 10\" (1.778 m)   Wt 230 lb (104.3 kg)   SpO2 98%   BMI 33.00 kg/m²   GENERAL: Mild respiratory distress, Alert and awake, Tachyponic, Appears tired and weak Breathing is labored but maintaining SPO2 >95% on supplemental O2. HEENT: PERRLA, no icterus. OP clear and no exudates. NECK: Supple  no carotid/ophthalmic bruits, JVD None. RESPIRATORY:  Bilateral equal vesicular with prolonged expiration breath sound with diffused bilateral expiratory wheezing. Lung bases has bilateral fine crepitations. HEART: tachycardia and regular rhythm. Normal S1 and S2, No S3 or S4 is audible. No pulsation, thrills, murmur or friction rubs. ABDOMEN: Soft, nondistended, nontender. No hepatomegaly or splenomegaly. No CVA tenderness on the both sides. Bowel sound is present. EXTREMITIES: All peripheral pulses are present. No calf tenderness or swelling. No pedal edema is present. Tania Silva NEUROLOGY: Alert and awake. No new focal neuro deficit. Bilateral Pupil is equal and reactive to light. CN-ii-xii otherwise grossly intact. Motor and Sensory: Grossly Intact bilaterally with no new focal signs     LABS:  Recent Labs     01/17/21  1747   WBC 4.0*   RBC 4.86   HGB 15.5   HCT 46.0   MCV 94.7   MCH 31.9   MCHC 33.7   RDW 13.0      MPV 10.2     Recent Labs     01/17/21  1747      K 3.9   CL 98   CO2 26   BUN 10   CREATININE 0.9   GLUCOSE 97   CALCIUM 8.3*     No results for input(s): POCGLU in the last 72 hours.   Results for orders placed or performed during the hospital encounter of 01/17/21   CBC Auto Differential   Result Value Ref Range    WBC 4.0 (L) 4.5 - 11.5 E9/L    RBC 4.86 3.80 - 5.80 E12/L    Hemoglobin 15.5 12.5 - 16.5 g/dL    Hematocrit 46.0 37.0 - 54.0 %    MCV 94.7 80.0 - 99.9 fL    MCH 31.9 26.0 - 35.0 pg    MCHC 33.7 32.0 - 34.5 %    RDW 13.0 11.5 - 15.0 fL    Platelets 030 430 - 264 E9/L    MPV 10.2 7.0 - 12.0 fL    Neutrophils % 60.0 43.0 - 80.0 %    Immature Granulocytes % 0.0 0.0 - 5.0 %    Lymphocytes % 27.4 20.0 - 42.0 %    Monocytes % 12.2 (H) 2.0 - 12.0 %    Eosinophils % 0.2 0.0 - 6.0 %    Basophils % 0.2 0.0 - 2.0 %    Neutrophils Absolute 2.41 1.80 - 7.30 E9/L    Immature Granulocytes # 0.00 E9/L    Lymphocytes Absolute 1.10 (L) 1.50 - 4.00 E9/L    Monocytes Absolute 0.49 0.10 - 0.95 E9/L    Eosinophils Absolute 0.01 (L) 0.05 - 0.50 E9/L    Basophils Absolute 0.01 0.00 - 0.20 E9/L   Comprehensive Metabolic Panel w/ Reflex to MG   Result Value Ref Range    Sodium 133 132 - 146 mmol/L    Potassium reflex Magnesium 3.9 3.5 - 5.0 mmol/L    Chloride 98 98 - 107 mmol/L    CO2 26 22 - 29 mmol/L    Anion Gap 9 7 - 16 mmol/L    Glucose 97 74 - 99 mg/dL    BUN 10 6 - 20 mg/dL    CREATININE 0.9 0.7 - 1.2 mg/dL    GFR Non-African American >60 >=60 mL/min/1.73    GFR African American >60     Calcium 8.3 (L) 8.6 - 10.2 mg/dL    Total Protein 7.4 6.4 - 8.3 g/dL    Alb 4.1 3.5 - 5.2 g/dL    Total Bilirubin 0.4 0.0 - 1.2 mg/dL    Alkaline Phosphatase 62 40 - 129 U/L     (H) 0 - 40 U/L     (H) 0 - 39 U/L   Troponin   Result Value Ref Range    Troponin <0.01 0.00 - 0.03 ng/mL   Brain Natriuretic Peptide   Result Value Ref Range    Pro-BNP 19 0 - 125 pg/mL   Lactate, Sepsis   Result Value Ref Range    Lactic Acid, Sepsis 0.7 0.5 - 1.9 mmol/L   Protime-INR   Result Value Ref Range    Protime 10.7 9.3 - 12.4 sec    INR 0.9    APTT   Result Value Ref Range    aPTT 30.9 24.5 - 35.1 sec   COVID-19   Result Value Ref Range    SARS-CoV-2, NAAT DETECTED (A) Not Detected   Blood Gas, Arterial   Result Value Ref Range    Date Analyzed [id number]     Time Analyzed 1950     Source: Blood Arterial     pH, Blood Gas 7.378 7.350 - 7.450    PCO2 39.6 35.0 - 45.0 mmHg    PO2 99.8 75.0 - 100.0 mmHg    HCO3 22.8 22.0 - 26.0 mmol/L    B.E. -2.1 -3.0 - 3.0 mmol/L    O2 Sat 97.0 92.0 - 98.5 %    O2Hb 96.4 94.0 - 97.0 %    COHb 0.2 0.0 - 1.5 %    MetHb 0.4 0.0 - 1.5 %    O2 Content 20.1 mL/dL    HHb 3.0 0.0 - 5.0 %    tHb (est) 14.8 11.5 - 16.5 g/dL    Mode NC- 3 L     Date Of Collection      Time Collected      Pt Temp 37.0 C     ID [id number]     Lab [id number]     Critical(s) Notified . No Critical Values    EKG 12 Lead   Result Value Ref Range    Ventricular Rate 81 BPM    Atrial Rate 81 BPM    P-R Interval 146 ms    QRS Duration 94 ms    Q-T Interval 358 ms    QTc Calculation (Bazett) 415 ms    P Axis 53 degrees    R Axis 28 degrees    T Axis 45 degrees     ED Course as of Jan 18 0550   Ruchi Pellet Jan 17, 2021   1706 On reevaluation, the patient is resting comfortably in bed. We are still awaiting results of tests at this time. [ML]   1920 Our clinical pharmacist recommends starting the patient on Actemra because he is Covid positive, febrile and requiring supplemental oxygen. Consult placed to infectious disease to discuss. [ML]   1958 The case was discussed with Dr. Symone Latham, the physician whom the patient lists as his PCP. Dr. Symone Latham states that she has not seen the patient in probably 8 years. She request that we admit the patient to the hospitalist service. [ML]   2030 I discussed the case with Dr. Samuel Kiser of internal medicine. He agrees to admit the patient for further evaluation and management. [ML]      ED Course User Index  [ML] Eden Dennis DO     Radiology: Cta Pulmonary W Contrast    Result Date: 1/17/2021  EXAMINATION: CTA OF THE CHEST 1/17/2021 5:24 pm TECHNIQUE: CTA of the chest was performed after the administration of intravenous contrast.  Multiplanar reformatted images are provided for review. MIP images are provided for review.  Dose modulation, iterative reconstruction, and/or weight based adjustment of the mA/kV was utilized to reduce the radiation dose to as low as reasonably achievable. COMPARISON: None. HISTORY: ORDERING SYSTEM PROVIDED HISTORY: Chest pain, SOB, concern for COVID TECHNOLOGIST PROVIDED HISTORY: Reason for exam:->Chest pain, SOB, concern for COVID FINDINGS: Pulmonary Arteries: Pulmonary arteries are adequately opacified for evaluation. No evidence of intraluminal filling defect to suggest pulmonary embolism. Main pulmonary artery is normal in caliber. Mediastinum: Subcentimeter mediastinal lymphadenopathy. The heart and pericardium demonstrate no acute abnormality. There is no acute abnormality of the thoracic aorta. Lungs/pleura: Diffuse and bilateral areas of ground-glass appearance consistent with pneumonia. COVID pneumonia suspected. .  No evidence of pleural effusion or pneumothorax. Upper Abdomen: Hepatic steatosis. Soft Tissues/Bones: No acute bone or soft tissue abnormality. No evidence of pulmonary embolism. Diffuse and bilateral areas of ground-glass appearance consistent with pneumonia. COVID pneumonia suspected. Hepatic steatosis. ASSESSMENT:    Present on Admission:   Pneumonia due to COVID-19 virus    PLAN:  # Acute Respiratory failure + Hypoxia+ Covid19 infection    Patient has Hypoxia Cough Tachycardia       Has Positive imaging suggestive of pneumonia   Septic workup with cultures in pregress   Started with Decadron + Remdesivir + Tocilizumab   Albuterol inhaler Q4-6hour PRN and Humidified Oxygen @2-6 L/min   Plan for Pulmonary & ID consult and Monitor Vitals /Telemetry  # Rest of the chronic medical problems are stable and will be managed with appropriately with home medications, placed nursing communication order to verify home medications before giving them to the patient.   # Diet: On PO Diet  # IVF's: No  # Fall Precaution: Yes  # Disposition: Home/primary residence   # Code Status: Full code by default  # DVT Prophylaxis : SC Heparin or SC Lovenox as on chart  The patient at bedside was counseled about clinical status, laboratory/imaging results, diagnoses, medication side effects, risk, and treatment plan, all questions were answered to patient's satisfaction and verbalized understanding      SIGNATURE: Mai Brandt PATIENT NAME: Theo Ray #: Hospitalist on call MRN: [id number]     Disclaimer: Portions of this note may have been generated using Dragon voice recognition software. Reasonable efforts were made to correct any dictation errors that resulted due to the programming of this software but some may still be present.

## 2021-01-18 NOTE — CARE COORDINATION
CM Note: 1/18/2021 at 9:16am: COVID POSITIVE - test done on 1/17/2021. CM called and talked to the patient in his room for transition of care. States he lives in a one story home with his fiance, her daughter and grandson, and is independent with his ADL's. Currently on 1L NC - but states no home O2 or nebulizer. DME: none. States no hx of HHC or SNF. PCP: Dr. Fransico Chester. [facility]acy: [facility] in [place]. States his plan is to return home when discharged and his fiance will provide transportation.  Joy Meade RN

## 2021-01-18 NOTE — CONSULTS
Located within [facility]  Consult Note    [address] [facility] [address]  [place], [address]  Phone [phone]   [phone]      Admit Date: 2021  5:23 PM  Pt Name: Kay Ames  MRN: [id number]  : 1965  Reason for Consult:    Chief Complaint   Patient presents with    Shortness of Breath     started saturday, 87% on room air     Requesting Physician:  China Woodward MD  PCP: Rebel Breen MD  History Obtained From:  patient  ID consulted for Pneumonia due to COVID-19 virus [U07.1, J12.82]  on hospital day 1  CHIEF COMPLAINT       Chief Complaint   Patient presents with    Shortness of Breath     started saturday, 87% on room air     HISTORYOF PRESENT ILLNESS      Kay Ames is a 54 y.o. male who presents with significant past medical history of  has no past medical history on file. who presents with   Chief Complaint   Patient presents with    Shortness of Breath     started saturday, 87% on room air     ED TRIAGEVITALS  BP: 128/81, Temp: 98.1 °F (36.7 °C), Pulse: 73, Resp: 21, SpO2: 98 %  HPI  Pt was in ER on  with abd pain/constipation wbc5 [id number]  abd xry Abnormal thick-walled loop of bowel left-sided abdomen  Ct a/p   1. No evidence of obstruction, small bowel wall thickening, or   adjacent fat stranding to suggest enteritis. 2. Few scattered colonic diverticula without evidence of acute   diverticulitis. 3. Diffuse fatty infiltration of the liver   D/c home with constipation   He comes in now with several days of worsening cough, fevers, chest pain and shortness of breath. Had fevers 101.5 dec appetite  wbc4 bin10 cr0.9 covid screen +  CT chest   No evidence of pulmonary embolism. Diffuse and bilateral areas of ground-glass appearance consistent with   pneumonia. COVID pneumonia suspected.    Hepatic steatosis     Pt was on 3L currently on 2.5 L  HE RECEIVED REMDESIVIR S/P TOCI ON DECADRON  [id number] feels better he thought he had uri  He works in a manufacturing place  Lives with girlfriend/daughter/grand son  Has Cough ith deep inspiration /fevers better appetite better  REVIEW OF SYSTEMS    (2-9 systems for level 4, 10 or more for level 5)     REVIEW OFSYSTEMS:    CONSTITUTIONAL:   fever, chills, no weight loss  ALLERGIES:    No urticaria, hay fever,    EYES:     No blurry vision, loss of vision,eye pain  ENT:      No hearing loss, sore throat  CARDIOVASCULAR:  No chest pain or palpitations  RESPIRATORY:    cough, sob  ENDOCRINE:    No increase thirst, urination   HEME-LYMPH:   No easy bruising or bleeding  GI:     No nausea, vomiting or diarrhea  :     No urinary complaints  NEURO:    No seizures, stroke, HA  MUSCULOSKELETAL:  No muscle aches or pain, no jointpain  SKIN:     No rash or itch  PSYCH:    No depression or anxiety    Medications Prior to Admission: Magnesium Citrate 1.745 GM/30ML solution, Take 150 ml by mouth x1 when necessary for constipation. May repeat this x1 one again at no results in 4 hours.  Dispense QS, no refills'  CURRENT MEDICATIONS     Current Facility-Administered Medications:     melatonin disintegrating tablet 5 mg, 5 mg, Oral, Nightly, Jemima Ge MD    thiamine tablet 100 mg, 100 mg, Oral, Daily, Jemima Ge MD    zinc gluconate tablet 50 mg, 50 mg, Oral, Daily, Jemima Ge MD    ascorbic acid (VITAMIN C) tablet 1,000 mg, 1,000 mg, Oral, Daily, Jemima Ge MD    vitamin B-6 (PYRIDOXINE) tablet 50 mg, 50 mg, Oral, Daily, Jemima Ge MD    Vitamin D (CHOLECALCIFEROL) tablet 3,000 Units, 3,000 Units, Oral, Daily, Jemima Ge MD    [COMPLETED] remdesivir 200 mg in sodium chloride 0.9 % 250 mL IVPB, 200 mg, Intravenous, Once, Stopped at 01/17/21 2114 **FOLLOWED BY** remdesivir 100 mg in sodium chloride 0.9 % 250 mL IVPB, 100 mg, Intravenous, Q24H, Mai Brandt MD    0.9 % sodium chloride bolus, 30 mL, Intravenous, PRN, Maxim Mcclure MD    dexamethasone (DECADRON) tablet 6 mg, 6 mg, Oral, Daily, Dmitriy Henson MD    sodium chloride flush 0.9 % injection 10 mL, 10 mL, Intravenous, 2 times per day, Dmitriy Henson MD, 10 mL at 01/17/21 2346    sodium chloride flush 0.9 % injection 10 mL, 10 mL, Intravenous, PRN, Dmitriy Henson MD    enoxaparin (LOVENOX) injection 40 mg, 40 mg, Subcutaneous, Daily, Dmitriy Henson MD    promethazine (PHENERGAN) tablet 12.5 mg, 12.5 mg, Oral, Q6H PRN **OR** ondansetron (ZOFRAN) injection 4 mg, 4 mg, Intravenous, Q6H PRN, Dmitriy Henson MD    polyethylene glycol (GLYCOLAX) packet 17 g, 17 g, Oral, Daily PRN, Dmitriy Henson MD, 17 g at 01/17/21 2352    famotidine (PEPCID) tablet 20 mg, 20 mg, Oral, BID, Dmitriy Henson MD, 20 mg at 01/17/21 2330    acetaminophen (TYLENOL) tablet 650 mg, 650 mg, Oral, Q6H PRN **OR** acetaminophen (TYLENOL) suppository 650 mg, 650 mg, Rectal, Q6H PRN, Dmitriy Henson MD  ALLERGIES     Patient has no known allergies. There is no immunization history on file for this patient. PAST MEDICAL HISTORY   No past medical history on file. SURGICAL HISTORY       Past Surgical History:   Procedure Laterality Date    EYE SURGERY       FAMILY HISTORY     No family history on file. SOCIAL HISTORY       Social History     Socioeconomic History    Marital status:      Spouse name: Not on file    Number of children: Not on file    Years of education: Not on file    Highest education level: Not on file   Occupational History    Not on file   Social Needs    Financial resource strain: Not on file    Food insecurity     Worry: Not on file     Inability: Not on file    Transportation needs     Medical: Not on file     Non-medical: Not on file   Tobacco Use    Smoking status: Never Smoker   Substance and Sexual Activity    Alcohol use:  Yes     Alcohol/week: 10.0 standard drinks     Types: 10 Cans of beer per week     Comment: weekly    Drug use: No    Sexual activity: Not on file   Lifestyle    Physical activity     Days per week: Not on file     Minutes per session: Not on file    Stress: Not on file   Relationships    Social connections     Talks on phone: Not on file     Gets together: Not on file     Attends Buddhism service: Not on file     Active member of club or organization: Not on file     Attends meetings of clubs or organizations: Not on file     Relationship status: Not on file    Intimate partner violence     Fear of current or ex partner: Not on file     Emotionally abused: Not on file     Physically abused: Not on file     Forced sexual activity: Not on file   Other Topics Concern    Not on file   Social History Narrative    Not on file     PHYSICAL EXAM    (up to 7 for level 4, 8 or more forlevel 5)     ED Triage Vitals   BP Temp Temp Source Pulse Resp SpO2 Height Weight   01/17/21 1729 01/17/21 1722 01/17/21 2215 01/17/21 1722 01/17/21 1722 01/17/21 1722 01/17/21 1945 01/17/21 1945   (!) 150/89 98 °F (36.7 °C) Oral 103 26 (!) 84 % 5' 10\" (1.778 m) 230 lb (104.3 kg)     Vitals:    Vitals:    01/17/21 1945 01/17/21 2041 01/17/21 2215 01/18/21 0415   BP:  108/71 128/81    Pulse:  69 73    Resp:   21    Temp:  98.8 °F (37.1 °C) 100.2 °F (37.9 °C) 98.1 °F (36.7 °C)   TempSrc:   Oral Infrared   SpO2:  98%     Weight: 230 lb (104.3 kg)      Height: 5' 10\" (1.778 m)        Physical Exam   Constitutional/General: Alert and oriented, NAD  Head: NC/AT  Eyes: PERRL, EOMI glasses  Mouth: Normal mucosa, no thrush   Neck: Supple, full ROM,    Pulmonary: Lungs dec to auscultation bilaterally with few rales. Not in respiratory distress  Cardiovascular:  Regular rate and rhythm, no murmurs, gallops, or rubs. Abdomen: Soft, + BS.   distension. Nontender. Extremities: Moves all extremities x 4.    Warm and well perfused  Pulses:  Distal pulses intact  Skin: Warm and dry without rash  Neurologic:    No focal deficits  Psych: Normal Affect     DIAGNOSTICRESULTS   RADIOLOGY:   Cta Pulmonary W Contrast    Result Date: Component Value Date    CHOL 175 03/10/2012    TRIG 76 03/10/2012    HDL 38.0 03/10/2012    LDLCALC 122 03/10/2012       MICROBIOLOGY:    Patient is a 54 y.o. male who presented with   Chief Complaint   Patient presents with    Shortness of Breath     started saturday, 87% on room air        FINAL IMPRESSION      1. Acute hypoxemic respiratory failure due to COVID-19 (HCC)        FEVERS LEUKOPENIA DUE TO ABOVE  transaminitis  S/P TOCI  Check tspot      melatonin disintegrating tablet 5 mg, Nightly    thiamine tablet 100 mg, Daily    zinc gluconate tablet 50 mg, Daily    ascorbic acid (VITAMIN C) tablet 1,000 mg, Daily    vitamin B-6 (PYRIDOXINE) tablet 50 mg, Daily    Vitamin D (CHOLECALCIFEROL) tablet 3,000 Units, Daily    remdesivir 100 mg in sodium chloride 0.9 % 250 mL IVPB, Q24H DAY 2    dexamethasone (DECADRON) tablet 6 mg, Daily    enoxaparin (LOVENOX) injection 40 mg, Daily    famotidine (PEPCID) tablet 20 mg, BID        Imaging and labs were reviewed per medical records and any ID pertinent labs were addressed with the patient. The patient/FAMILY  was educated about the diagnosis, prognosis, indications, risks and benefits of treatment. An opportunity to ask questions was given to the patient/FAMILY and questions were answered. Thank you for involving me in the care of Marcia Rouse. Please do not hesitate to call for any questions or concerns.          Electronically signed by Harper Lauren MD on 1/18/2021 at 7:48 AM

## 2021-01-19 LAB
ALBUMIN SERPL-MCNC: 3.7 G/DL (ref 3.5–5.2)
ALP BLD-CCNC: 59 U/L (ref 40–129)
ALT SERPL-CCNC: 87 U/L (ref 0–40)
ANION GAP SERPL CALCULATED.3IONS-SCNC: 9 MMOL/L (ref 7–16)
AST SERPL-CCNC: 76 U/L (ref 0–39)
BILIRUB SERPL-MCNC: 0.3 MG/DL (ref 0–1.2)
BUN BLDV-MCNC: 14 MG/DL (ref 6–20)
C-REACTIVE PROTEIN: 2.3 MG/DL (ref 0–0.4)
CALCIUM SERPL-MCNC: 8.1 MG/DL (ref 8.6–10.2)
CHLORIDE BLD-SCNC: 103 MMOL/L (ref 98–107)
CHOLESTEROL, TOTAL: 157 MG/DL (ref 0–199)
CO2: 24 MMOL/L (ref 22–29)
CREAT SERPL-MCNC: 0.7 MG/DL (ref 0.7–1.2)
D DIMER: 204 NG/ML DDU
FERRITIN: 6110 NG/ML
FIBRINOGEN: 571 MG/DL (ref 225–540)
GFR AFRICAN AMERICAN: >60
GFR NON-AFRICAN AMERICAN: >60 ML/MIN/1.73
GLUCOSE BLD-MCNC: 115 MG/DL (ref 74–99)
HDLC SERPL-MCNC: 27 MG/DL
IGA: 284 MG/DL (ref 70–400)
IGG: 900 MG/DL (ref 700–1600)
IGM: 71 MG/DL (ref 40–230)
INR BLD: 1.1
LACTATE DEHYDROGENASE: 366 U/L (ref 135–225)
LACTIC ACID: 1.1 MMOL/L (ref 0.5–2.2)
LDL CHOLESTEROL CALCULATED: 113 MG/DL (ref 0–99)
POTASSIUM SERPL-SCNC: 4 MMOL/L (ref 3.5–5)
PROTHROMBIN TIME: 12.6 SEC (ref 9.3–12.4)
SEDIMENTATION RATE, ERYTHROCYTE: 17 MM/HR (ref 0–15)
SODIUM BLD-SCNC: 136 MMOL/L (ref 132–146)
TOTAL CK: 1002 U/L (ref 20–200)
TOTAL PROTEIN: 6.7 G/DL (ref 6.4–8.3)
TRIGL SERPL-MCNC: 85 MG/DL (ref 0–149)
TROPONIN: <0.01 NG/ML (ref 0–0.03)
VLDLC SERPL CALC-MCNC: 17 MG/DL

## 2021-01-19 PROCEDURE — 85610 PROTHROMBIN TIME: CPT

## 2021-01-19 PROCEDURE — 86140 C-REACTIVE PROTEIN: CPT

## 2021-01-19 PROCEDURE — 83605 ASSAY OF LACTIC ACID: CPT

## 2021-01-19 PROCEDURE — 83615 LACTATE (LD) (LDH) ENZYME: CPT

## 2021-01-19 PROCEDURE — 80061 LIPID PANEL: CPT

## 2021-01-19 PROCEDURE — 82728 ASSAY OF FERRITIN: CPT

## 2021-01-19 PROCEDURE — 6370000000 HC RX 637 (ALT 250 FOR IP): Performed by: SPECIALIST

## 2021-01-19 PROCEDURE — 36415 COLL VENOUS BLD VENIPUNCTURE: CPT

## 2021-01-19 PROCEDURE — 85651 RBC SED RATE NONAUTOMATED: CPT

## 2021-01-19 PROCEDURE — 6360000002 HC RX W HCPCS: Performed by: INTERNAL MEDICINE

## 2021-01-19 PROCEDURE — XW13325 TRANSFUSION OF CONVALESCENT PLASMA (NONAUTOLOGOUS) INTO PERIPHERAL VEIN, PERCUTANEOUS APPROACH, NEW TECHNOLOGY GROUP 5: ICD-10-PCS | Performed by: INTERNAL MEDICINE

## 2021-01-19 PROCEDURE — 84484 ASSAY OF TROPONIN QUANT: CPT

## 2021-01-19 PROCEDURE — 6370000000 HC RX 637 (ALT 250 FOR IP): Performed by: INTERNAL MEDICINE

## 2021-01-19 PROCEDURE — 83520 IMMUNOASSAY QUANT NOS NONAB: CPT

## 2021-01-19 PROCEDURE — 82550 ASSAY OF CK (CPK): CPT

## 2021-01-19 PROCEDURE — 99232 SBSQ HOSP IP/OBS MODERATE 35: CPT | Performed by: INTERNAL MEDICINE

## 2021-01-19 PROCEDURE — 82785 ASSAY OF IGE: CPT

## 2021-01-19 PROCEDURE — 2580000003 HC RX 258: Performed by: INTERNAL MEDICINE

## 2021-01-19 PROCEDURE — 82784 ASSAY IGA/IGD/IGG/IGM EACH: CPT

## 2021-01-19 PROCEDURE — 2500000003 HC RX 250 WO HCPCS: Performed by: INTERNAL MEDICINE

## 2021-01-19 PROCEDURE — 85384 FIBRINOGEN ACTIVITY: CPT

## 2021-01-19 PROCEDURE — 80053 COMPREHEN METABOLIC PANEL: CPT

## 2021-01-19 PROCEDURE — 2060000000 HC ICU INTERMEDIATE R&B

## 2021-01-19 PROCEDURE — 85378 FIBRIN DEGRADE SEMIQUANT: CPT

## 2021-01-19 PROCEDURE — 86481 TB AG RESPONSE T-CELL SUSP: CPT

## 2021-01-19 RX ADMIN — Medication 3000 UNITS: at 08:30

## 2021-01-19 RX ADMIN — Medication 5 MG: at 20:47

## 2021-01-19 RX ADMIN — FAMOTIDINE 20 MG: 20 TABLET, FILM COATED ORAL at 08:30

## 2021-01-19 RX ADMIN — ENOXAPARIN SODIUM 40 MG: 40 INJECTION SUBCUTANEOUS at 08:29

## 2021-01-19 RX ADMIN — PYRIDOXINE HCL TAB 50 MG 50 MG: 50 TAB at 08:30

## 2021-01-19 RX ADMIN — FAMOTIDINE 20 MG: 20 TABLET, FILM COATED ORAL at 20:47

## 2021-01-19 RX ADMIN — ZINC SULFATE 220 MG (50 MG) CAPSULE 50 MG: CAPSULE at 08:30

## 2021-01-19 RX ADMIN — DEXAMETHASONE 6 MG: 4 TABLET ORAL at 08:30

## 2021-01-19 RX ADMIN — Medication 1000 MG: at 08:58

## 2021-01-19 RX ADMIN — Medication 10 ML: at 08:30

## 2021-01-19 RX ADMIN — Medication 10 ML: at 20:54

## 2021-01-19 RX ADMIN — ACETAMINOPHEN 650 MG: 325 TABLET, FILM COATED ORAL at 08:58

## 2021-01-19 RX ADMIN — THIAMINE HCL TAB 100 MG 100 MG: 100 TAB at 08:30

## 2021-01-19 RX ADMIN — REMDESIVIR 100 MG: 100 INJECTION, POWDER, LYOPHILIZED, FOR SOLUTION INTRAVENOUS at 20:47

## 2021-01-19 RX ADMIN — SODIUM CHLORIDE 30 ML: 9 INJECTION, SOLUTION INTRAVENOUS at 21:21

## 2021-01-19 ASSESSMENT — PAIN SCALES - GENERAL: PAINLEVEL_OUTOF10: 3

## 2021-01-19 ASSESSMENT — PAIN DESCRIPTION - LOCATION: LOCATION: HEAD

## 2021-01-19 ASSESSMENT — PAIN DESCRIPTION - PAIN TYPE
TYPE: ACUTE PAIN
TYPE: ACUTE PAIN

## 2021-01-19 NOTE — FLOWSHEET NOTE
Nan Abraham updated on pt condition and plan for possible discharge tomorrow. All questions were answered at this time.

## 2021-01-19 NOTE — FLOWSHEET NOTE
Pulse ox was 90% on room air at rest. Ambulated patient in hallway on room air. Oxygen saturation was 90-92% on room air while ambulating. Pt tolerated ambulation very well.

## 2021-01-19 NOTE — CARE COORDINATION
CM Note: 1/19/2021 at 10:29am: COVID POSITIVE - test done on 1/17/2021. Cm called and talked to the patient on the phone in his room. States he is still wearing his oxygen when he gets up and at night. Nursing states patient is on 1L NC. On IV Remedesivir. Lives with his fiance, her daughter and grandson and works as a . States his plan remains to return home when discharged and his fiance will provide transportation.  Sarah Douglass RN

## 2021-01-19 NOTE — PROGRESS NOTES
Admitting Date and Time: 1/17/2021  5:23 PM  Admit Dx: Pneumonia due to COVID-19 virus [U07.1, J12.82]    Subjective:      Less cough less sob, no fever  Per RN: no issues    ROS: denies fever, chills, cp, sob, n/v, HA unless stated above.      melatonin  5 mg Oral Nightly    thiamine mononitrate  100 mg Oral Daily    zinc sulfate  50 mg Oral Daily    vitamin C  1,000 mg Oral Daily    vitamin B-6  50 mg Oral Daily    Vitamin D  3,000 Units Oral Daily    remdesivir IVPB  100 mg Intravenous Q24H    dexamethasone  6 mg Oral Daily    sodium chloride flush  10 mL Intravenous 2 times per day    enoxaparin  40 mg Subcutaneous Daily    famotidine  20 mg Oral BID         sodium chloride, 30 mL, PRN      sodium chloride flush, 10 mL, PRN      promethazine, 12.5 mg, Q6H PRN    Or      ondansetron, 4 mg, Q6H PRN      polyethylene glycol, 17 g, Daily PRN      acetaminophen, 650 mg, Q6H PRN    Or      acetaminophen, 650 mg, Q6H PRN         Objective:    BP (!) 104/58   Pulse 66   Temp 97.6 °F (36.4 °C) (Infrared)   Resp 16   Ht 5' 10\" (1.778 m)   Wt 230 lb (104.3 kg)   SpO2 91%   BMI 33.00 kg/m²   General Appearance: alert and oriented to person, place and time and in no acute distress  Skin: warm and dry  Head: normocephalic and atraumatic  Eyes: pupils equal, round, and reactive to light, extraocular eye movements intact, conjunctivae normal  Neck: neck supple and non tender without mass   Pulmonary/Chest: clear to auscultation bilaterally- no wheezes, rales or rhonchi, normal air movement, no respiratory distress  Cardiovascular: normal rate, normal S1 and S2 and no carotid bruits  Abdomen: soft, non-tender, non-distended, normal bowel sounds, no masses or organomegaly  Extremities: no cyanosis, no clubbing and no  edema  Neurologic: no cranial nerve deficit and speech normal      Recent Labs     01/17/21  1747 01/18/21  0847 01/19/21  0736    135 136   K 3.9 4.4 4.0   CL 98 103 103   CO2 26 22 24 BUN 10 12 14   CREATININE 0.9 0.7 0.7   GLUCOSE 97 158* 115*   CALCIUM 8.3* 8.3* 8.1*       Recent Labs     01/17/21  1747 01/18/21  0847 01/19/21  0736   ALKPHOS 62 58 59   PROT 7.4 7.2 6.7   LABALBU 4.1 3.9 3.7   BILITOT 0.4 0.3 0.3   * 92* 76*   * 109* 87*       Recent Labs     01/17/21 1747 01/18/21  0847   WBC 4.0* 2.5*   RBC 4.86 4.97   HGB 15.5 15.7   HCT 46.0 46.5   MCV 94.7 93.6   MCH 31.9 31.6   MCHC 33.7 33.8   RDW 13.0 12.9    157   MPV 10.2 10.8           Radiology:   CTA PULMONARY W CONTRAST   Final Result   No evidence of pulmonary embolism. Diffuse and bilateral areas of ground-glass appearance consistent with   pneumonia. COVID pneumonia suspected. Hepatic steatosis. Assessment:  Active Problems:    Pneumonia due to COVID-19 virus  Resolved Problems:    * No resolved hospital problems. *      Plan:  Presented with SOB and cough for last few days prior to arrival to the hospital. SOB is associated with some cough, nonproductive and subjective fever and chills. Initially SOB was mild, intermittent but gradually getting more persistent. Started gradually. Exacerbated by general activity or exertion. Relieved only partially by rest.     1. Acute Respiratory failure + Hypoxia+ Covid19 infection       with cough Tachycardia      Imaging suggestive of pneumonia  Septic workup with cultures in progress  Decadron + Remdesivir. Tocilizumab given by admitting hospitalist x 1  Albuterol inhaler Q4-6hour PRN   Plan for Pulmonary & ID consult  He was just weaned down to room air moments before I saw him so I will watch him overnight to make sure he remains on room air before discharge tomorrow    2. Family polyp with last c-scope (-). Otherwise No chronic medical problems. Not on any home medications. Encourage to get annual physicals which he has not done. 3. Fatty liver found on ct in ed when presented with ab pain due to constipation on 1/9/2019  4.  Disposition: Home/primary residence   5. Full code   6. DVT Prophylaxis : [place] Lovenox  All questions were answered to patient's satisfaction and verbalized understanding     NOTE: This report was transcribed using voice recognition software. Every effort was made to ensure accuracy; however, inadvertent computerized transcription errors may be present.      Electronically signed by Lexie Everett MD on 1/19/2021 at 12:06 PM

## 2021-01-19 NOTE — PROGRESS NOTES
[facility] PROGRESS NOTE      F/u SARS-COV-2 infection FACE TO FACE  Chief Complaint   Patient presents with    Shortness of Breath     started saturday, 87% on room air     No face-to-face examination today due to the efforts to preserve PPE and limit transmission/exposure to COVID-19. All relevant records and diagnostic tests were reviewed, including laboratory results and imaging. Please reference any relevant documentation elsewhere. Care will be coordinated with patient's care team.  Marylou Skaggs is a 54 y.o. male patient seen 01/19/21  FEVERS BETTER AFEBRILE   ON RA    Subjective:  Symptoms:  Stable. No shortness of breath. Diet:  Adequate intake. Activity level: Normal.    Pain:  He reports no pain. MEDICATION:   melatonin  5 mg Oral Nightly    thiamine mononitrate  100 mg Oral Daily    zinc sulfate  50 mg Oral Daily    vitamin C  1,000 mg Oral Daily    vitamin B-6  50 mg Oral Daily    Vitamin D  3,000 Units Oral Daily    remdesivir IVPB  100 mg Intravenous Q24H    dexamethasone  6 mg Oral Daily    sodium chloride flush  10 mL Intravenous 2 times per day    enoxaparin  40 mg Subcutaneous Daily    famotidine  20 mg Oral BID     No Known Allergies    BP (!) 104/58   Pulse 66   Temp 97.6 °F (36.4 °C) (Infrared)   Resp 16   Ht 5' 10\" (1.778 m)   Wt 230 lb (104.3 kg)   SpO2 91%   BMI 33.00 kg/m²   Objective:  General Appearance:  Comfortable (in chair). Vital signs: (most recent): Blood pressure 105/67, pulse 58, temperature 97.7 °F (36.5 °C), temperature source Infrared, resp. rate 18, height 5' 10\" (1.778 m), weight 230 lb (104.3 kg), SpO2 90 %. Output: Producing urine and producing stool.       DATA:  Lab Results   Component Value Date    COVID19 Non-Reactive 01/18/2021    COVID19 DETECTED 01/17/2021     COVID-19/SARS-COV-2 LABS  Recent Labs     01/17/21  1747 01/18/21  0847 01/19/21  0736   CRP  --   --  2.3* FERRITIN  --   --  6,110   LDH  --   --  366*   TROPONINI <0.01  --  <0.01   DDIMER  --   --  204   FIBRINOGEN  --   --  571*   INR 0.9  --  1.1   PROTIME 10.7  --  12.6*   * 92* 76*   * 109* 87*   TRIG  --   --  85     Lab Results   Component Value Date    CHOL 157 01/19/2021    TRIG 85 01/19/2021    HDL 27 01/19/2021    LDLCALC 113 01/19/2021    LABVLDL 17 01/19/2021     Lab Results   Component Value Date/Time    VITD25 15 (L) 03/10/2012 01:19 PM     Recent Labs     01/17/21 1747 01/18/21  0847   WBC 4.0* 2.5*   HGB 15.5 15.7   HCT 46.0 46.5    157   MCV 94.7 93.6   MCH 31.9 31.6   MCHC 33.7 33.8   RDW 13.0 12.9   LYMPHOPCT 27.4 23.7   MONOPCT 12.2* 4.3   BASOPCT 0.2 0.0   MONOSABS 0.49 0.11   LYMPHSABS 1.10* 0.60*   EOSABS 0.01* 0.00*   BASOSABS 0.01 0.00     Recent Labs     01/17/21 1747 01/18/21  0847 01/19/21  0736    135 136   K 3.9 4.4 4.0   CL 98 103 103   CO2 26 22 24   BUN 10 12 14   CREATININE 0.9 0.7 0.7   GFRAA >60 >60 >60   LABGLOM >60 >60 >60   GLUCOSE 97 158* 115*   PROT 7.4 7.2 6.7   LABALBU 4.1 3.9 3.7   CALCIUM 8.3* 8.3* 8.1*   BILITOT 0.4 0.3 0.3   ALKPHOS 62 58 59   * 92* 76*   * 109* 87*     U/A:  No results found for: NITRITE, COLORU, PROTEINU, PHUR, LABCAST, WBCUA, RBCUA, MUCUS, TRICHOMONAS, YEAST, BACTERIA, CLARITYU, SPECGRAV, LEUKOCYTESUR, UROBILINOGEN, BILIRUBINUR, BLOODU, GLUCOSEU, AMORPHOUS     MICROBIOLOGY:   Blood cultures   Blood Culture, Routine   Date Value Ref Range Status   01/17/2021 24 Hours no growth  Preliminary       Assessment:  (Pneumonia due to COVID-19 virus  transaminitis  Elevated ferritin  Fevers resolved).      Plan:   (  Remdesivir DAY 3  Actemra X1  Convalescent plasma X1  COVID AB NEG   DECADRON 6MG  qday    proning, side to side positions  Is   Inc activity   monitor off o2      melatonin disintegrating tablet 5 mg, Nightly    thiamine mononitrate tablet 100 mg, Daily    zinc sulfate (ZINCATE) capsule 50 mg, Daily   vitamin C tablet 1,000 mg, Daily    vitamin B-6 (PYRIDOXINE) tablet 50 mg, Daily    Vitamin D (CHOLECALCIFEROL) tablet 3,000 Units, Daily    remdesivir 100 mg in sodium chloride 0.9 % 250 mL IVPB, Q24H    enoxaparin (LOVENOX) injection 40 mg, Daily    famotidine (PEPCID) tablet 20 mg, BID    Follow COVID-19/SARS-COV-2- BIOMARKERS  CONTINUE CURRENT MEDICATIONS AND SUPPORTIVE CARE.   ). Imaging and labs were reviewed per medical records and any ID pertinent labs were addressed with the patient. The patient was educated about the diagnosis, prognosis, indications, risks and benefits of treatment. Pt had the opportunity to ask questions. All questions were answered. Thank you for involving me in the care of Gina Roa. Please do not hesitate to call for any questions or concerns.     Electronically signed by Chas Arce MD on 1/19/2021 at 1:46 PM    Phone [phone]  Fax [phone]

## 2021-01-19 NOTE — FLOWSHEET NOTE
Pt educated on importance of proning throughout the day. This nurse explained the benefits of proning and improving oxygenation multiple times. Pt agreed to turn side to side in the bed.

## 2021-01-20 VITALS
WEIGHT: 230 LBS | BODY MASS INDEX: 32.93 KG/M2 | DIASTOLIC BLOOD PRESSURE: 57 MMHG | HEIGHT: 70 IN | OXYGEN SATURATION: 93 % | TEMPERATURE: 97 F | HEART RATE: 72 BPM | SYSTOLIC BLOOD PRESSURE: 117 MMHG | RESPIRATION RATE: 17 BRPM

## 2021-01-20 LAB
ALBUMIN SERPL-MCNC: 3.6 G/DL (ref 3.5–5.2)
ALP BLD-CCNC: 61 U/L (ref 40–129)
ALT SERPL-CCNC: 81 U/L (ref 0–40)
ANION GAP SERPL CALCULATED.3IONS-SCNC: 8 MMOL/L (ref 7–16)
AST SERPL-CCNC: 62 U/L (ref 0–39)
BILIRUB SERPL-MCNC: 0.4 MG/DL (ref 0–1.2)
BUN BLDV-MCNC: 16 MG/DL (ref 6–20)
CALCIUM SERPL-MCNC: 8.4 MG/DL (ref 8.6–10.2)
CHLORIDE BLD-SCNC: 100 MMOL/L (ref 98–107)
CO2: 28 MMOL/L (ref 22–29)
CREAT SERPL-MCNC: 0.8 MG/DL (ref 0.7–1.2)
GFR AFRICAN AMERICAN: >60
GFR NON-AFRICAN AMERICAN: >60 ML/MIN/1.73
GLUCOSE BLD-MCNC: 120 MG/DL (ref 74–99)
IGE: 20 KU/L
POTASSIUM SERPL-SCNC: 4.1 MMOL/L (ref 3.5–5)
PROCALCITONIN: 0.19 NG/ML (ref 0–0.08)
SODIUM BLD-SCNC: 136 MMOL/L (ref 132–146)
TOTAL PROTEIN: 6.8 G/DL (ref 6.4–8.3)

## 2021-01-20 PROCEDURE — 6370000000 HC RX 637 (ALT 250 FOR IP): Performed by: INTERNAL MEDICINE

## 2021-01-20 PROCEDURE — 99239 HOSP IP/OBS DSCHRG MGMT >30: CPT | Performed by: INTERNAL MEDICINE

## 2021-01-20 PROCEDURE — 6360000002 HC RX W HCPCS: Performed by: INTERNAL MEDICINE

## 2021-01-20 PROCEDURE — 2500000003 HC RX 250 WO HCPCS: Performed by: INTERNAL MEDICINE

## 2021-01-20 PROCEDURE — 2580000003 HC RX 258: Performed by: INTERNAL MEDICINE

## 2021-01-20 PROCEDURE — 6370000000 HC RX 637 (ALT 250 FOR IP): Performed by: SPECIALIST

## 2021-01-20 PROCEDURE — 80053 COMPREHEN METABOLIC PANEL: CPT

## 2021-01-20 PROCEDURE — 36415 COLL VENOUS BLD VENIPUNCTURE: CPT

## 2021-01-20 RX ORDER — DEXAMETHASONE 6 MG/1
6 TABLET ORAL
Qty: 7 TABLET | Refills: 0 | Status: SHIPPED | OUTPATIENT
Start: 2021-01-20 | End: 2021-01-27

## 2021-01-20 RX ORDER — GAUZE BANDAGE 2" X 2"
100 BANDAGE TOPICAL DAILY
Refills: 0 | COMMUNITY
Start: 2021-01-20 | End: 2022-09-26

## 2021-01-20 RX ORDER — PYRIDOXINE HCL (VITAMIN B6) 50 MG
50 TABLET ORAL DAILY
Qty: 30 TABLET | Refills: 3 | COMMUNITY
Start: 2021-01-20 | End: 2022-09-26

## 2021-01-20 RX ADMIN — Medication 3000 UNITS: at 08:38

## 2021-01-20 RX ADMIN — FAMOTIDINE 20 MG: 20 TABLET, FILM COATED ORAL at 08:38

## 2021-01-20 RX ADMIN — DEXAMETHASONE 6 MG: 4 TABLET ORAL at 08:38

## 2021-01-20 RX ADMIN — Medication 1000 MG: at 12:08

## 2021-01-20 RX ADMIN — THIAMINE HCL TAB 100 MG 100 MG: 100 TAB at 08:38

## 2021-01-20 RX ADMIN — ENOXAPARIN SODIUM 40 MG: 40 INJECTION SUBCUTANEOUS at 08:39

## 2021-01-20 RX ADMIN — PYRIDOXINE HCL TAB 50 MG 50 MG: 50 TAB at 08:38

## 2021-01-20 RX ADMIN — Medication 10 ML: at 08:41

## 2021-01-20 RX ADMIN — ZINC SULFATE 220 MG (50 MG) CAPSULE 50 MG: CAPSULE at 08:38

## 2021-01-20 RX ADMIN — REMDESIVIR 100 MG: 100 INJECTION, POWDER, LYOPHILIZED, FOR SOLUTION INTRAVENOUS at 13:53

## 2021-01-20 ASSESSMENT — PAIN SCALES - GENERAL
PAINLEVEL_OUTOF10: 0
PAINLEVEL_OUTOF10: 0

## 2021-01-20 ASSESSMENT — ENCOUNTER SYMPTOMS: SHORTNESS OF BREATH: 0

## 2021-01-20 NOTE — FLOWSHEET NOTE
Pt educated on incentive spirometer use 10x per hour and proning as much as possible. Pt understands by showing return demonstration of incentive spirometer use and is using consistently throughout shift but will only lay side to side at this time.

## 2021-01-20 NOTE — PROGRESS NOTES
[facility] PROGRESS NOTE      F/u SARS-COV-2 infection FACE TO FACE  Chief Complaint   Patient presents with    Shortness of Breath     started saturday, 87% on room air     Deirdre Izquierdo is a 54 y.o. male patient seen 01/20/21  PT IN CHAIR NAD   OFF O2 FEELS WELL    Subjective:  Symptoms:  Stable. No shortness of breath or chest pain. Diet:  Adequate intake. Activity level: Normal.    Pain:  He reports no pain. MEDICATION:   melatonin  5 mg Oral Nightly    thiamine mononitrate  100 mg Oral Daily    zinc sulfate  50 mg Oral Daily    ascorbic acid  1,000 mg Oral Daily    vitamin B-6  50 mg Oral Daily    Vitamin D  3,000 Units Oral Daily    remdesivir IVPB  100 mg Intravenous Q24H    dexamethasone  6 mg Oral Daily    sodium chloride flush  10 mL Intravenous 2 times per day    enoxaparin  40 mg Subcutaneous Daily    famotidine  20 mg Oral BID     No Known Allergies    BP (!) 117/57   Pulse 72   Temp 97 °F (36.1 °C) (Infrared)   Resp 17   Ht 5' 10\" (1.778 m)   Wt 230 lb (104.3 kg)   SpO2 93%   BMI 33.00 kg/m²   Objective:  General Appearance:  Comfortable. Vital signs: (most recent): Blood pressure (!) 117/57, pulse 72, temperature 97 °F (36.1 °C), temperature source Infrared, resp. rate 17, height 5' 10\" (1.778 m), weight 230 lb (104.3 kg), SpO2 93 %. Vital signs are normal.    Output: Producing urine and producing stool. HEENT: Normal HEENT exam.    Lungs:  Normal effort and normal respiratory rate. There are decreased breath sounds. Heart: Normal rate. Regular rhythm. S1 normal and S2 normal.    Abdomen: Bowel sounds are normal.   There is no abdominal tenderness. Extremities: Normal range of motion. Neurological: Patient is alert and oriented to person, place and time. Skin:  Dry.       DATA:  Lab Results   Component Value Date    COVID19 Non-Reactive 01/18/2021    COVID19 DETECTED 01/17/2021 COVID-19/SARS-COV-2 LABS  Recent Labs     01/17/21  1747 01/18/21  0847 01/19/21  0736 01/20/21  0818   CRP  --   --  2.3*  --    FERRITIN  --   --  6,110  --    LDH  --   --  366*  --    TROPONINI <0.01  --  <0.01  --    DDIMER  --   --  204  --    FIBRINOGEN  --   --  571*  --    INR 0.9  --  1.1  --    PROTIME 10.7  --  12.6*  --    * 92* 76* 62*   * 109* 87* 81*   TRIG  --   --  85  --      Lab Results   Component Value Date    CHOL 157 01/19/2021    TRIG 85 01/19/2021    HDL 27 01/19/2021    LDLCALC 113 01/19/2021    LABVLDL 17 01/19/2021     Lab Results   Component Value Date/Time    VITD25 15 (L) 03/10/2012 01:19 PM     Recent Labs     01/17/21 1747 01/18/21  0847   WBC 4.0* 2.5*   HGB 15.5 15.7   HCT 46.0 46.5    157   MCV 94.7 93.6   MCH 31.9 31.6   MCHC 33.7 33.8   RDW 13.0 12.9   LYMPHOPCT 27.4 23.7   MONOPCT 12.2* 4.3   BASOPCT 0.2 0.0   MONOSABS 0.49 0.11   LYMPHSABS 1.10* 0.60*   EOSABS 0.01* 0.00*   BASOSABS 0.01 0.00     Recent Labs     01/18/21  0847 01/18/21  0847 01/19/21  0736 01/20/21  0818     --  136 136   K 4.4   < > 4.0 4.1     --  103 100   CO2 22  --  24 28   BUN 12  --  14 16   CREATININE 0.7  --  0.7 0.8   GFRAA >60  --  >60 >60   LABGLOM >60  --  >60 >60   GLUCOSE 158*  --  115* 120*   PROT 7.2  --  6.7 6.8   LABALBU 3.9  --  3.7 3.6   CALCIUM 8.3*  --  8.1* 8.4*   BILITOT 0.3  --  0.3 0.4   ALKPHOS 58  --  59 61   AST 92*  --  76* 62*   *  --  87* 81*    < > = values in this interval not displayed. U/A:  No results found for: NITRITE, COLORU, PROTEINU, PHUR, LABCAST, WBCUA, RBCUA, MUCUS, TRICHOMONAS, YEAST, BACTERIA, CLARITYU, SPECGRAV, LEUKOCYTESUR, UROBILINOGEN, BILIRUBINUR, BLOODU, GLUCOSEU, AMORPHOUS     MICROBIOLOGY:   Blood cultures   Blood Culture, Routine   Date Value Ref Range Status   01/17/2021 24 Hours no growth  Preliminary       Assessment:    Condition: In stable condition. Improving.    (Pneumonia due to COVID-19 virus  Transaminitis  Elevated ferritin  Fevers resolved    ). Plan:   Encourage ambulation and out of bed and up to chair. (Remdesivir DAY 4  Actemra X1  Convalescent plasma X1  COVID AB NEG   DECADRON 6MG  qday    proning, side to side positions  Is   Inc activity   monitor off o2  AMBULATORY PULSE OX  D/C PLANNING ). Active Hospital Problems    Diagnosis Date Noted    Pneumonia due to COVID-19 virus [U07.1, J12.82] 01/17/2021       SARS-COV-2- positive with pneumonia  · Remdesivir   · Actemra  · Convalescent plasma   · DECADRON 6MG  qday    · Vitamins   · Recommend proning, side to side positions  · Is   · Inc activity           melatonin disintegrating tablet 5 mg, Nightly      thiamine mononitrate tablet 100 mg, Daily      zinc sulfate (ZINCATE) capsule 50 mg, Daily      ascorbic acid (VITAMIN C) tablet 1,000 mg, Daily      vitamin B-6 (PYRIDOXINE) tablet 50 mg, Daily      Vitamin D (CHOLECALCIFEROL) tablet 3,000 Units, Daily      remdesivir 100 mg in sodium chloride 0.9 % 250 mL IVPB, Q24H      0.9 % sodium chloride bolus, PRN      dexamethasone (DECADRON) tablet 6 mg, Daily      sodium chloride flush 0.9 % injection 10 mL, 2 times per day      sodium chloride flush 0.9 % injection 10 mL, PRN      enoxaparin (LOVENOX) injection 40 mg, Daily      promethazine (PHENERGAN) tablet 12.5 mg, Q6H PRN    Or      ondansetron (ZOFRAN) injection 4 mg, Q6H PRN      polyethylene glycol (GLYCOLAX) packet 17 g, Daily PRN      famotidine (PEPCID) tablet 20 mg, BID      acetaminophen (TYLENOL) tablet 650 mg, Q6H PRN    Or      acetaminophen (TYLENOL) suppository 650 mg, Q6H PRN           Follow COVID-19/SARS-COV-2- BIOMARKERS  CONTINUE CURRENT MEDICATIONS AND SUPPORTIVE CARE. Imaging and labs were reviewed per medical records and any ID pertinent labs were addressed with the patient. The patient was educated about the diagnosis, prognosis, indications, risks and benefits of treatment. Pt had the opportunity to ask questions. All questions were answered. Thank you for involving me in the care of Crecencio Kanner. Please do not hesitate to call for any questions or concerns.     Electronically signed by Remedios Naik MD on 1/20/2021 at 1:02 PM    Phone [phone]  Fax [phone]

## 2021-01-20 NOTE — CARE COORDINATION
DELPHINE Note: 1/20/2021: COVID POSITIVE - test done on 1/17/2021. Noted discharge in [address] CM called and talked to the patient in his room. Remains on RA. States his plan remains to return home and his wife will provide transportation. Denies any home going needs.  Alejandro Hernadez RN

## 2021-01-20 NOTE — DISCHARGE SUMMARY
[facility] Physician Discharge Summary             See your PCP within 1 week of discharge      Activity level: Slowly increase as tolerated    Diet: DIET GENERAL;    Labs: None are pending at the discharge    Condition at discharge: Stable    Dispo: Return to home setting     Patient ID:  Tressa Arango  [id number]  54 y.o.  1965    Admit date: 1/17/2021    Discharge date and time:  1/20/2021  11:58 AM    Admission Diagnoses: Principal Problem:    Pneumonia due to COVID-19 virus  Resolved Problems:    * No resolved hospital problems. *      Discharge Diagnoses: Principal Problem:    Pneumonia due to COVID-19 virus  Resolved Problems:    * No resolved hospital problems. *      Consults:  IP CONSULT TO INTERNAL MEDICINE  IP CONSULT TO INFECTIOUS DISEASES  IP CONSULT TO PHARMACY  PHARMACY TO DOSE MEDICATION    Procedures: None significant except if described in hospital course. Hospital Course:   Presented with SOB and cough for last few days prior to arrival to the hospital. SOB is associated with some cough, nonproductive and subjective fever and chills. Initially SOB was mild, intermittent but gradually getting more persistent. Started gradually. Exacerbated by general activity or exertion. Relieved only partially by rest.      1. Acute Respiratory failure + Hypoxia+ Covid19 infection with cough & Tachycardia. Imaging suggestive of pneumonia  Septic workup (-) with (-) cultures. We will continue to finish and complete days of Decadron but no longer in need of remdesivir. Tocilizumab given by admitting hospitalist x 1  Had Albuterol inhaler Q4-6hour PRN   ID consult appreciated  He was just weaned down to room air for just over 24 hours prior to discharge with proven stability. 2. Family polyp with last c-scope (-). Otherwise No chronic medical problems. Not on any home medications. Encourage to get annual physicals which he has not done.   3. Fatty liver found on ct in ed when presented with ab pain due to constipation on 1/9/2019    I encouraged him to follow-up regularly with PCP in fact he will see PCP prior to returning to work. We had a long discussion about some of her common interests on his last day of admission. All questions were answered to patient's satisfaction and verbalized understanding        Discharge Exam:  Vitals:    01/19/21 1915 01/20/21 0300 01/20/21 0545 01/20/21 0834   BP: 105/66  105/67 (!) 117/57   Pulse: 61  58 72   Resp: 16  18 17   Temp: 97.9 °F (36.6 °C)  97.7 °F (36.5 °C) 97 °F (36.1 °C)   TempSrc: Infrared  Infrared Infrared   SpO2: 91% 90% 90% 93%   Weight:       Height:           No acute distress moist membranes   Normocephalic, without obvious abnormality, atraumatic, external ears without lesions,   Neck supple no cervical lymphadenopathy  Heart has a regular rate and rhythm no murmur  Lungs are clear to auscultation bilaterally with equal movements. Abdomen is soft nontender nondistended no rebound or guarding. No significant peripheral edema good peripheral perfusion. No significant rashes or new skin lesions. No new focality on neuro exam which is overall grossly intact. Affect and mood seem to be appropriate     I/O last 3 completed shifts: In: 220 [P.O.:220]  Out: -   I/O this shift:  In: 240 [P.O.:240]  Out: -       LABS:  Recent Labs     01/18/21  0847 01/19/21  0736 01/20/21  0818    136 136   K 4.4 4.0 4.1    103 100   CO2 22 24 28   BUN 12 14 16   CREATININE 0.7 0.7 0.8   GLUCOSE 158* 115* 120*   CALCIUM 8.3* 8.1* 8.4*       Recent Labs     01/17/21  1747 01/18/21  0847   WBC 4.0* 2.5*   RBC 4.86 4.97   HGB 15.5 15.7   HCT 46.0 46.5   MCV 94.7 93.6   MCH 31.9 31.6   MCHC 33.7 33.8   RDW 13.0 12.9    157   MPV 10.2 10.8       No results for input(s): POCGLU in the last 72 hours.       Imaging:  Cta Pulmonary W Contrast    Result Date: 1/17/2021  EXAMINATION: CTA OF THE CHEST 1/17/2021 5:24 pm TECHNIQUE: CTA of the chest was performed after the administration of intravenous contrast.  Multiplanar reformatted images are provided for review. MIP images are provided for review. Dose modulation, iterative reconstruction, and/or weight based adjustment of the mA/kV was utilized to reduce the radiation dose to as low as reasonably achievable. COMPARISON: None. HISTORY: ORDERING SYSTEM PROVIDED HISTORY: Chest pain, SOB, concern for COVID TECHNOLOGIST PROVIDED HISTORY: Reason for exam:->Chest pain, SOB, concern for COVID FINDINGS: Pulmonary Arteries: Pulmonary arteries are adequately opacified for evaluation. No evidence of intraluminal filling defect to suggest pulmonary embolism. Main pulmonary artery is normal in caliber. Mediastinum: Subcentimeter mediastinal lymphadenopathy. The heart and pericardium demonstrate no acute abnormality. There is no acute abnormality of the thoracic aorta. Lungs/pleura: Diffuse and bilateral areas of ground-glass appearance consistent with pneumonia. COVID pneumonia suspected. .  No evidence of pleural effusion or pneumothorax. Upper Abdomen: Hepatic steatosis. Soft Tissues/Bones: No acute bone or soft tissue abnormality. No evidence of pulmonary embolism. Diffuse and bilateral areas of ground-glass appearance consistent with pneumonia. COVID pneumonia suspected. Hepatic steatosis.         Patient Instructions:   Current Discharge Medication List      START taking these medications    Details   dexamethasone (DECADRON) 6 MG tablet Take 1 tablet by mouth daily (with breakfast) for 7 days  Qty: 7 tablet, Refills: 0      ascorbic acid (VITAMIN C) 1000 MG tablet Take 1 tablet by mouth daily  Qty: 30 tablet, Refills: 3      thiamine mononitrate 100 MG tablet Take 1 tablet by mouth daily  Refills: 0      vitamin B-6 (B-6) 50 MG tablet Take 1 tablet by mouth daily  Qty: 30 tablet, Refills: 3         STOP taking these medications       Magnesium Citrate 1.745 GM/30ML solution Comments:   Reason for Stopping:                 Note that more than 30 minutes was spent in preparing discharge papers, discussing discharge with patient, medication review, etc.    NOTE: This report was transcribed using voice recognition software. Every effort was made to ensure accuracy; however, inadvertent computerized transcription errors may be present.      Signed:  Electronically signed by Mikey Dowell MD on 1/20/2021 at 11:58 AM

## 2021-01-20 NOTE — PLAN OF CARE
Problem: Airway Clearance - Ineffective  Goal: Achieve or maintain patent airway  Outcome: Ongoing     Problem: Gas Exchange - Impaired  Goal: Absence of hypoxia  Outcome: Ongoing  Goal: Promote optimal lung function  Outcome: Ongoing     Problem: Breathing Pattern - Ineffective  Goal: Ability to achieve and maintain a regular respiratory rate  Outcome: Ongoing     Problem:  Body Temperature -  Risk of, Imbalanced  Goal: Ability to maintain a body temperature within defined limits  Outcome: Ongoing  Goal: Will regain or maintain usual level of consciousness  Outcome: Ongoing  Goal: Complications related to the disease process, condition or treatment will be avoided or minimized  Outcome: Ongoing     Problem: Isolation Precautions - Risk of Spread of Infection  Goal: Prevent transmission of infection  Outcome: Ongoing     Problem: Nutrition Deficits  Goal: Optimize nutritional status  Outcome: Ongoing     Problem: Risk for Fluid Volume Deficit  Goal: Maintain normal heart rhythm  Outcome: Ongoing  Goal: Maintain absence of muscle cramping  Outcome: Ongoing  Goal: Maintain normal serum potassium, sodium, calcium, phosphorus, and pH  Outcome: Ongoing     Problem: Loneliness or Risk for Loneliness  Goal: Demonstrate positive use of time alone when socialization is not possible  Outcome: Ongoing     Problem: Fatigue  Goal: Verbalize increase energy and improved vitality  Outcome: Ongoing     Problem: Patient Education: Go to Patient Education Activity  Goal: Patient/Family Education  Outcome: Ongoing     Problem: Pain:  Goal: Pain level will decrease  Outcome: Ongoing  Goal: Control of acute pain  Outcome: Ongoing  Goal: Control of chronic pain  Outcome: Ongoing

## 2021-01-20 NOTE — DISCHARGE INSTR - COC
Continuity of Care Form    Patient Name: Crecencio Kanner   :  1965  MRN:  [id number]    Admit date:  2021  Discharge date:  ***    Code Status Order: Full Code   Advance Directives:      Admitting Physician:  Shakira Justice DO  PCP: Aurelio Cotto MD    Discharging Nurse: [facility] Unit/Room#: 0620/0620-01  Discharging Unit Phone Number: ***    Emergency Contact:   Extended Emergency Contact Information  Primary Emergency Contact: Helene Gilmore  Address: [address] [facility] [address] Phone: [phone]  Work Phone: [phone]  Mobile Phone: [phone]  Relation: Domestic Partner  Secondary Emergency Contact: [address] Phone: [phone]  Relation: Brother/Sister    Past Surgical History:  Past Surgical History:   Procedure Laterality Date    EYE SURGERY         Immunization History: There is no immunization history on file for this patient.     Active Problems:  Patient Active Problem List   Diagnosis Code    Pneumonia due to COVID-19 virus U07.1, J12.82       Isolation/Infection:   Isolation            Droplet Plus          Patient Infection Status       Infection Onset Added Last Indicated Last Indicated By Review Planned Expiration Resolved Resolved By    COVID-19 21 COVID-19 21      Resolved    COVID-19 Rule Out 21 COVID-19 (Ordered)   21 Rule-Out Test Resulted            Nurse Assessment:  Last Vital Signs: /67   Pulse 58   Temp 97.7 °F (36.5 °C) (Infrared)   Resp 18   Ht 5' 10\" (1.778 m)   Wt 230 lb (104.3 kg)   SpO2 90%   BMI 33.00 kg/m²     Last documented pain score (0-10 scale): Pain Level: 0  Last Weight:   Wt Readings from Last 1 Encounters:   21 230 lb (104.3 kg)     Mental Status:  {IP PT MENTAL STATUS::::0}    IV Access:  { JUSTIN IV ACCESS:477365719:::0}    Nursing Mobility/ADLs:  Walking   {[facility] DME ADLs:293126394:::0}  Transfer  {[facility] DME ADLs:424340428:::0}  Bathing  {[facility]P DME ADLs:607204994:::0}  Dressing  {[facility]P DME ADLs:320775110:::0}  Toileting  {[facility] DME ADLs:678605061:::0}  Feeding  {[facility] DME ADLs:199463766:::0}  Med Admin  {[facility] DME ADLs:624856676:::0}  Med Delivery   {[facility] [place] Delivery:385469235:::0}    Wound Care Documentation and Therapy:        Elimination:  Continence: Bowel: {YES / RM:92021}  Bladder: {YES / TO:10013}  Urinary Catheter: {Urinary Catheter:811229099:::0}   Colostomy/Ileostomy/Ileal Conduit: {YES / NH:36138}       Date of Last BM: ***    Intake/Output Summary (Last 24 hours) at 2021 0812  Last data filed at 2021 1724  Gross per 24 hour   Intake 220 ml   Output --   Net 220 ml     I/O last 3 completed shifts:   In: 26 [P.O.:220]  Out: -     Safety Concerns:     [address] Concerns:992161802:::0}    Impairments/Disabilities:      [address] Impairments/Disabilities:891563340:::0}    Nutrition Therapy:  Current Nutrition Therapy:   [address] Diet List:999433663:::0}    Routes of Feeding: {CHP DME Other Feedings:615967407:::0}  Liquids: {Slp liquid thickness:49896}  Daily Fluid Restriction: {CHP DME Yes amt example:820374022:::0}  Last Modified Barium Swallow with Video (Video Swallowing Test): {Done Not Done LSRS:460335839:::9}    Treatments at the Time of Hospital Discharge:   Respiratory Treatments: ***  Oxygen Therapy:  {Therapy; copd oxygen:19313:::0}  Ventilator:    { CC Vent List:276115854:::0}    Rehab Therapies: {THERAPEUTIC INTERVENTION:3854495096}  Weight Bearing Status/Restrictions: { CC Weight Bearin:::0}  Other Medical Equipment (for information only, NOT a DME order):  {EQUIPMENT:591724956}  Other Treatments: ***    Patient's personal belongings (please select all that are sent with patient):  {CHP DME Belongings:205348057:::0}    RN SIGNATURE:  {Esignature:822927395:::0}    CASE MANAGEMENT/SOCIAL WORK SECTION    Inpatient Status Date: ***    Readmission Risk Assessment Score:  Readmission Risk              Risk of Unplanned Readmission:        11           Discharging to Facility/ Agency   Name:   Address:  Phone:  Fax:    Dialysis Facility (if applicable)   Name:  Address:  Dialysis Schedule:  Phone:  Fax:    / signature: {Esignature:134281345:::0}    PHYSICIAN SECTION    Prognosis: {Prognosis:5599034526:::0}    Condition at Discharge: [address] [place] [address] Patient Condition:665342652:::0}    Rehab Potential (if transferring to Rehab): {Prognosis:1317706384:::0}    Recommended Labs or Other Treatments After Discharge: ***    Physician Certification: I certify the above information and transfer of Noelle Frausto  is necessary for the continuing treatment of the diagnosis listed and that he requires {Admit to Appropriate Level of Care:50202:::0} for {GREATER/LESS:926642271} 30 days.      Update Admission H&P: {CHP DME Changes in HandP:265628095:::0}    PHYSICIAN SIGNATURE:  {Esignature:531855685:::0}

## 2021-01-21 ENCOUNTER — CARE COORDINATION (OUTPATIENT)
Dept: CASE MANAGEMENT | Age: 56
End: 2021-01-21

## 2021-01-21 NOTE — CARE COORDINATION
Covid-19 Outreach call, no answer.   Left VM with contact information and request  for return call at [address], [address] [place] [address] Coordination Transition

## 2021-01-22 ENCOUNTER — CARE COORDINATION (OUTPATIENT)
Dept: CASE MANAGEMENT | Age: 56
End: 2021-01-22

## 2021-01-22 LAB
BLOOD CULTURE, ROUTINE: NORMAL
CULTURE, BLOOD 2: NORMAL
Lab: NORMAL
REPORT: NORMAL
THIS TEST SENT TO: NORMAL

## 2021-01-22 NOTE — CARE COORDINATION
being evaluated for COVID-19. If possible, put on a facemask before emergency medical services arrive. The patient agrees to contact the Conduit exposure line [phone], local [facility] department [place] [facility]: ([phone]) and PCP office for questions related to their healthcare. Author provided contact information for future reference. Patient/family/caregiver given information for [employer] and agrees to enroll {Blank Single Select Template:20061::\"yes\"  Patient's preferred phone number: [phone]  Based on Loop alert triggers, patient will be contacted by nurse care manager for worsening symptoms.     Fay Oreilly, [address]  Care Coordination Transition

## 2021-01-23 LAB
COMMENT: NORMAL
REPORT: NORMAL

## 2021-04-02 ENCOUNTER — HOSPITAL ENCOUNTER (OUTPATIENT)
Age: 56
Discharge: HOME | End: 2021-04-02
Payer: COMMERCIAL

## 2021-04-02 LAB
ALBUMIN SERPL-MCNC: 4.4 G/DL (ref 3.5–5.2)
ALP BLD-CCNC: 79 U/L (ref 40–129)
ALT SERPL-CCNC: 124 U/L (ref 0–40)
ANION GAP SERPL CALCULATED.3IONS-SCNC: 10 MMOL/L (ref 7–16)
AST SERPL-CCNC: 103 U/L (ref 0–39)
BASOPHILS ABSOLUTE: 0.05 E9/L (ref 0–0.2)
BASOPHILS RELATIVE PERCENT: 1.1 % (ref 0–2)
BILIRUB SERPL-MCNC: 0.6 MG/DL (ref 0–1.2)
BUN BLDV-MCNC: 12 MG/DL (ref 6–20)
CALCIUM SERPL-MCNC: 9.5 MG/DL (ref 8.6–10.2)
CHLORIDE BLD-SCNC: 101 MMOL/L (ref 98–107)
CHOLESTEROL, TOTAL: 236 MG/DL (ref 0–199)
CO2: 28 MMOL/L (ref 22–29)
CREAT SERPL-MCNC: 0.8 MG/DL (ref 0.7–1.2)
EOSINOPHILS ABSOLUTE: 0.29 E9/L (ref 0.05–0.5)
EOSINOPHILS RELATIVE PERCENT: 6.1 % (ref 0–6)
GFR AFRICAN AMERICAN: >60
GFR NON-AFRICAN AMERICAN: >60 ML/MIN/1.73
GLUCOSE BLD-MCNC: 124 MG/DL (ref 74–99)
HCT VFR BLD CALC: 47.4 % (ref 37–54)
HDLC SERPL-MCNC: 31 MG/DL
HEMOGLOBIN: 15.8 G/DL (ref 12.5–16.5)
IMMATURE GRANULOCYTES #: 0.01 E9/L
IMMATURE GRANULOCYTES %: 0.2 % (ref 0–5)
LDL CHOLESTEROL CALCULATED: 163 MG/DL (ref 0–99)
LYMPHOCYTES ABSOLUTE: 1.28 E9/L (ref 1.5–4)
LYMPHOCYTES RELATIVE PERCENT: 27 % (ref 20–42)
MCH RBC QN AUTO: 32.1 PG (ref 26–35)
MCHC RBC AUTO-ENTMCNC: 33.3 % (ref 32–34.5)
MCV RBC AUTO: 96.3 FL (ref 80–99.9)
MONOCYTES ABSOLUTE: 0.6 E9/L (ref 0.1–0.95)
MONOCYTES RELATIVE PERCENT: 12.7 % (ref 2–12)
NEUTROPHILS ABSOLUTE: 2.51 E9/L (ref 1.8–7.3)
NEUTROPHILS RELATIVE PERCENT: 52.9 % (ref 43–80)
PDW BLD-RTO: 13.8 FL (ref 11.5–15)
PLATELET # BLD: 200 E9/L (ref 130–450)
PMV BLD AUTO: 10.1 FL (ref 7–12)
POTASSIUM SERPL-SCNC: 4.9 MMOL/L (ref 3.5–5)
PROSTATE SPECIFIC ANTIGEN: 0.8 NG/ML (ref 0–4)
RBC # BLD: 4.92 E12/L (ref 3.8–5.8)
SODIUM BLD-SCNC: 139 MMOL/L (ref 132–146)
T4 TOTAL: 5.4 MCG/DL (ref 4.5–11.7)
TOTAL PROTEIN: 7.1 G/DL (ref 6.4–8.3)
TRIGL SERPL-MCNC: 211 MG/DL (ref 0–149)
TSH SERPL DL<=0.05 MIU/L-ACNC: 2.13 UIU/ML (ref 0.27–4.2)
URIC ACID, SERUM: 5.8 MG/DL (ref 3.4–7)
VITAMIN B-12: 459 PG/ML (ref 211–946)
VITAMIN D 25-HYDROXY: 31 NG/ML (ref 30–100)
VLDLC SERPL CALC-MCNC: 42 MG/DL
WBC # BLD: 4.7 E9/L (ref 4.5–11.5)

## 2021-04-02 PROCEDURE — 84436 ASSAY OF TOTAL THYROXINE: CPT

## 2021-04-02 PROCEDURE — 82306 VITAMIN D 25 HYDROXY: CPT

## 2021-04-02 PROCEDURE — 85025 COMPLETE CBC W/AUTO DIFF WBC: CPT

## 2021-04-02 PROCEDURE — 80053 COMPREHEN METABOLIC PANEL: CPT

## 2021-04-02 PROCEDURE — 80061 LIPID PANEL: CPT

## 2021-04-02 PROCEDURE — 82607 VITAMIN B-12: CPT

## 2021-04-02 PROCEDURE — 36415 COLL VENOUS BLD VENIPUNCTURE: CPT

## 2021-04-02 PROCEDURE — 84550 ASSAY OF BLOOD/URIC ACID: CPT

## 2021-04-02 PROCEDURE — 84443 ASSAY THYROID STIM HORMONE: CPT

## 2021-04-02 PROCEDURE — G0103 PSA SCREENING: HCPCS

## 2021-04-15 ENCOUNTER — HOSPITAL ENCOUNTER (OUTPATIENT)
Age: 56
Discharge: HOME | End: 2021-04-15
Payer: COMMERCIAL

## 2021-04-15 ENCOUNTER — HOSPITAL ENCOUNTER (OUTPATIENT)
Dept: ULTRASOUND IMAGING | Age: 56
Discharge: HOME | End: 2021-04-15
Payer: COMMERCIAL

## 2021-04-15 DIAGNOSIS — R74.8 ACID PHOSPHATASE ELEVATED: ICD-10-CM

## 2021-04-15 LAB
ALBUMIN SERPL-MCNC: 4.5 G/DL (ref 3.5–5.2)
ALP BLD-CCNC: 88 U/L (ref 40–129)
ALT SERPL-CCNC: 112 U/L (ref 0–40)
ANION GAP SERPL CALCULATED.3IONS-SCNC: 10 MMOL/L (ref 7–16)
AST SERPL-CCNC: 92 U/L (ref 0–39)
BILIRUB SERPL-MCNC: 0.4 MG/DL (ref 0–1.2)
BUN BLDV-MCNC: 12 MG/DL (ref 6–20)
CALCIUM SERPL-MCNC: 9.4 MG/DL (ref 8.6–10.2)
CHLORIDE BLD-SCNC: 105 MMOL/L (ref 98–107)
CO2: 26 MMOL/L (ref 22–29)
CREAT SERPL-MCNC: 0.8 MG/DL (ref 0.7–1.2)
GFR AFRICAN AMERICAN: >60
GFR NON-AFRICAN AMERICAN: >60 ML/MIN/1.73
GLUCOSE BLD-MCNC: 104 MG/DL (ref 74–99)
HBA1C MFR BLD: 6.2 % (ref 4–5.6)
POTASSIUM SERPL-SCNC: 4.6 MMOL/L (ref 3.5–5)
SODIUM BLD-SCNC: 141 MMOL/L (ref 132–146)
TOTAL PROTEIN: 7.1 G/DL (ref 6.4–8.3)

## 2021-04-15 PROCEDURE — 80053 COMPREHEN METABOLIC PANEL: CPT

## 2021-04-15 PROCEDURE — 83036 HEMOGLOBIN GLYCOSYLATED A1C: CPT

## 2021-04-15 PROCEDURE — 36415 COLL VENOUS BLD VENIPUNCTURE: CPT

## 2021-04-15 PROCEDURE — 76705 ECHO EXAM OF ABDOMEN: CPT

## 2021-08-16 ENCOUNTER — HOSPITAL ENCOUNTER (OUTPATIENT)
Age: 56
Discharge: HOME | End: 2021-08-16
Payer: COMMERCIAL

## 2021-08-16 LAB
ALBUMIN SERPL-MCNC: 4.1 G/DL (ref 3.5–5.2)
ALBUMIN SERPL-MCNC: 4.2 G/DL (ref 3.5–5.2)
ALP BLD-CCNC: 71 U/L (ref 40–129)
ALP BLD-CCNC: 74 U/L (ref 40–129)
ALT SERPL-CCNC: 50 U/L (ref 0–40)
ALT SERPL-CCNC: 50 U/L (ref 0–40)
ANION GAP SERPL CALCULATED.3IONS-SCNC: 9 MMOL/L (ref 7–16)
AST SERPL-CCNC: 43 U/L (ref 0–39)
AST SERPL-CCNC: 44 U/L (ref 0–39)
BILIRUB SERPL-MCNC: 0.5 MG/DL (ref 0–1.2)
BILIRUB SERPL-MCNC: 0.5 MG/DL (ref 0–1.2)
BILIRUBIN DIRECT: <0.2 MG/DL (ref 0–0.3)
BILIRUBIN, INDIRECT: ABNORMAL MG/DL (ref 0–1)
BUN BLDV-MCNC: 18 MG/DL (ref 6–20)
CALCIUM SERPL-MCNC: 8.9 MG/DL (ref 8.6–10.2)
CHLORIDE BLD-SCNC: 106 MMOL/L (ref 98–107)
CHOLESTEROL, TOTAL: 150 MG/DL (ref 0–199)
CO2: 23 MMOL/L (ref 22–29)
CREAT SERPL-MCNC: 0.9 MG/DL (ref 0.7–1.2)
FERRITIN: 899 NG/ML
GAMMA GLUTAMYL TRANSFERASE: 47 U/L (ref 10–71)
GFR AFRICAN AMERICAN: >60
GFR NON-AFRICAN AMERICAN: >60 ML/MIN/1.73
GLUCOSE BLD-MCNC: 122 MG/DL (ref 74–99)
HDLC SERPL-MCNC: 35 MG/DL
IRON SATURATION: 37 % (ref 20–55)
IRON: 94 MCG/DL (ref 59–158)
LDL CHOLESTEROL CALCULATED: 91 MG/DL (ref 0–99)
POTASSIUM SERPL-SCNC: 4.3 MMOL/L (ref 3.5–5)
SODIUM BLD-SCNC: 138 MMOL/L (ref 132–146)
TOTAL IRON BINDING CAPACITY: 252 MCG/DL (ref 250–450)
TOTAL PROTEIN: 6.7 G/DL (ref 6.4–8.3)
TOTAL PROTEIN: 7.1 G/DL (ref 6.4–8.3)
TRIGL SERPL-MCNC: 120 MG/DL (ref 0–149)
VLDLC SERPL CALC-MCNC: 24 MG/DL

## 2021-08-16 PROCEDURE — 83540 ASSAY OF IRON: CPT

## 2021-08-16 PROCEDURE — 86038 ANTINUCLEAR ANTIBODIES: CPT

## 2021-08-16 PROCEDURE — 82103 ALPHA-1-ANTITRYPSIN TOTAL: CPT

## 2021-08-16 PROCEDURE — 80061 LIPID PANEL: CPT

## 2021-08-16 PROCEDURE — 83550 IRON BINDING TEST: CPT

## 2021-08-16 PROCEDURE — 86256 FLUORESCENT ANTIBODY TITER: CPT

## 2021-08-16 PROCEDURE — 82977 ASSAY OF GGT: CPT

## 2021-08-16 PROCEDURE — 80053 COMPREHEN METABOLIC PANEL: CPT

## 2021-08-16 PROCEDURE — 80074 ACUTE HEPATITIS PANEL: CPT

## 2021-08-16 PROCEDURE — 80076 HEPATIC FUNCTION PANEL: CPT

## 2021-08-16 PROCEDURE — 36415 COLL VENOUS BLD VENIPUNCTURE: CPT

## 2021-08-16 PROCEDURE — 86255 FLUORESCENT ANTIBODY SCREEN: CPT

## 2021-08-16 PROCEDURE — 82728 ASSAY OF FERRITIN: CPT

## 2021-08-17 LAB
ALPHA-1 ANTITRYPSIN: 127 MG/DL (ref 90–200)
ANTI-MITOCHONDRIAL AB, IFA: NEGATIVE
ANTI-NUCLEAR ANTIBODY (ANA): NEGATIVE
ANTISMOOTH MUSCLE AB, QUANT: NORMAL
HAV IGM SER IA-ACNC: NORMAL
HEPATITIS B CORE IGM ANTIBODY: NORMAL
HEPATITIS B SURFACE ANTIGEN INTERPRETATION: NORMAL
HEPATITIS C ANTIBODY INTERPRETATION: NORMAL
SMOOTH MUSCLE ANTIBODY: POSITIVE

## 2021-12-29 ENCOUNTER — HOSPITAL ENCOUNTER (OUTPATIENT)
Age: 56
Discharge: HOME | End: 2021-12-29
Payer: COMMERCIAL

## 2021-12-29 LAB
ALBUMIN SERPL-MCNC: 4.7 G/DL (ref 3.5–5.2)
ALP BLD-CCNC: 70 U/L (ref 40–129)
ALT SERPL-CCNC: 38 U/L (ref 0–40)
AST SERPL-CCNC: 32 U/L (ref 0–39)
BILIRUB SERPL-MCNC: 0.5 MG/DL (ref 0–1.2)
BILIRUBIN DIRECT: <0.2 MG/DL (ref 0–0.3)
BILIRUBIN, INDIRECT: NORMAL MG/DL (ref 0–1)
GAMMA GLUTAMYL TRANSFERASE: 37 U/L (ref 10–71)
TOTAL PROTEIN: 7.6 G/DL (ref 6.4–8.3)

## 2021-12-29 PROCEDURE — 80076 HEPATIC FUNCTION PANEL: CPT

## 2021-12-29 PROCEDURE — 82977 ASSAY OF GGT: CPT

## 2021-12-29 PROCEDURE — 36415 COLL VENOUS BLD VENIPUNCTURE: CPT

## 2022-07-23 ENCOUNTER — HOSPITAL ENCOUNTER (OUTPATIENT)
Age: 57
Discharge: HOME | End: 2022-07-23
Payer: COMMERCIAL

## 2022-07-23 LAB
ALBUMIN SERPL-MCNC: 4.6 G/DL (ref 3.5–5.2)
ALP BLD-CCNC: 67 U/L (ref 40–129)
ALT SERPL-CCNC: 49 U/L (ref 0–40)
ANION GAP SERPL CALCULATED.3IONS-SCNC: 11 MMOL/L (ref 7–16)
AST SERPL-CCNC: 41 U/L (ref 0–39)
BASOPHILS ABSOLUTE: 0.04 E9/L (ref 0–0.2)
BASOPHILS RELATIVE PERCENT: 0.8 % (ref 0–2)
BILIRUB SERPL-MCNC: 0.7 MG/DL (ref 0–1.2)
BUN BLDV-MCNC: 17 MG/DL (ref 6–20)
CALCIUM SERPL-MCNC: 9.4 MG/DL (ref 8.6–10.2)
CHLORIDE BLD-SCNC: 105 MMOL/L (ref 98–107)
CHOLESTEROL, FASTING: 157 MG/DL (ref 0–199)
CO2: 25 MMOL/L (ref 22–29)
CREAT SERPL-MCNC: 0.8 MG/DL (ref 0.7–1.2)
EOSINOPHILS ABSOLUTE: 0.25 E9/L (ref 0.05–0.5)
EOSINOPHILS RELATIVE PERCENT: 5 % (ref 0–6)
GFR AFRICAN AMERICAN: >60
GFR NON-AFRICAN AMERICAN: >60 ML/MIN/1.73
GLUCOSE FASTING: 115 MG/DL (ref 74–99)
HBA1C MFR BLD: 6 % (ref 4–5.6)
HCT VFR BLD CALC: 48.1 % (ref 37–54)
HDLC SERPL-MCNC: 32 MG/DL
HEMOGLOBIN: 15.9 G/DL (ref 12.5–16.5)
IMMATURE GRANULOCYTES #: 0.04 E9/L
IMMATURE GRANULOCYTES %: 0.8 % (ref 0–5)
LDL CHOLESTEROL CALCULATED: 96 MG/DL (ref 0–99)
LYMPHOCYTES ABSOLUTE: 1.31 E9/L (ref 1.5–4)
LYMPHOCYTES RELATIVE PERCENT: 26 % (ref 20–42)
MCH RBC QN AUTO: 31.9 PG (ref 26–35)
MCHC RBC AUTO-ENTMCNC: 33.1 % (ref 32–34.5)
MCV RBC AUTO: 96.4 FL (ref 80–99.9)
MONOCYTES ABSOLUTE: 0.55 E9/L (ref 0.1–0.95)
MONOCYTES RELATIVE PERCENT: 10.9 % (ref 2–12)
NEUTROPHILS ABSOLUTE: 2.84 E9/L (ref 1.8–7.3)
NEUTROPHILS RELATIVE PERCENT: 56.5 % (ref 43–80)
PDW BLD-RTO: 12.8 FL (ref 11.5–15)
PLATELET # BLD: 240 E9/L (ref 130–450)
PMV BLD AUTO: 10 FL (ref 7–12)
POTASSIUM SERPL-SCNC: 4.4 MMOL/L (ref 3.5–5)
PROSTATE SPECIFIC ANTIGEN: 1.13 NG/ML (ref 0–4)
RBC # BLD: 4.99 E12/L (ref 3.8–5.8)
SODIUM BLD-SCNC: 141 MMOL/L (ref 132–146)
T4 TOTAL: 5.2 MCG/DL (ref 4.5–11.7)
TOTAL PROTEIN: 7.4 G/DL (ref 6.4–8.3)
TRIGLYCERIDE, FASTING: 146 MG/DL (ref 0–149)
TSH SERPL DL<=0.05 MIU/L-ACNC: 0.99 UIU/ML (ref 0.27–4.2)
VITAMIN B-12: 440 PG/ML (ref 211–946)
VLDLC SERPL CALC-MCNC: 29 MG/DL
WBC # BLD: 5 E9/L (ref 4.5–11.5)

## 2022-07-23 PROCEDURE — 83036 HEMOGLOBIN GLYCOSYLATED A1C: CPT

## 2022-07-23 PROCEDURE — 80053 COMPREHEN METABOLIC PANEL: CPT

## 2022-07-23 PROCEDURE — 85025 COMPLETE CBC W/AUTO DIFF WBC: CPT

## 2022-07-23 PROCEDURE — 82607 VITAMIN B-12: CPT

## 2022-07-23 PROCEDURE — 36415 COLL VENOUS BLD VENIPUNCTURE: CPT

## 2022-07-23 PROCEDURE — 84443 ASSAY THYROID STIM HORMONE: CPT

## 2022-07-23 PROCEDURE — 84436 ASSAY OF TOTAL THYROXINE: CPT

## 2022-07-23 PROCEDURE — 80061 LIPID PANEL: CPT

## 2022-07-23 PROCEDURE — G0103 PSA SCREENING: HCPCS

## 2022-09-26 ENCOUNTER — HOSPITAL ENCOUNTER (EMERGENCY)
Age: 57
Discharge: ANOTHER HOSPITAL | End: 2022-09-26
Attending: EMERGENCY MEDICINE
Payer: COMMERCIAL

## 2022-09-26 ENCOUNTER — APPOINTMENT (OUTPATIENT)
Dept: GENERAL RADIOLOGY | Age: 57
End: 2022-09-26
Payer: COMMERCIAL

## 2022-09-26 ENCOUNTER — APPOINTMENT (OUTPATIENT)
Dept: CT IMAGING | Age: 57
DRG: 247 | End: 2022-09-26
Attending: INTERNAL MEDICINE
Payer: COMMERCIAL

## 2022-09-26 ENCOUNTER — HOSPITAL ENCOUNTER (INPATIENT)
Age: 57
LOS: 3 days | Discharge: HOME | DRG: 247 | End: 2022-09-29
Attending: INTERNAL MEDICINE | Admitting: INTERNAL MEDICINE
Payer: COMMERCIAL

## 2022-09-26 VITALS
HEART RATE: 73 BPM | BODY MASS INDEX: 26.57 KG/M2 | DIASTOLIC BLOOD PRESSURE: 84 MMHG | TEMPERATURE: 97.4 F | OXYGEN SATURATION: 96 % | WEIGHT: 185.2 LBS | SYSTOLIC BLOOD PRESSURE: 130 MMHG | RESPIRATION RATE: 24 BRPM

## 2022-09-26 DIAGNOSIS — I21.3 ST ELEVATION MYOCARDIAL INFARCTION (STEMI), UNSPECIFIED ARTERY (HCC): Primary | ICD-10-CM

## 2022-09-26 PROBLEM — Z82.49 FAMILY HISTORY OF EARLY CAD: Status: ACTIVE | Noted: 2022-09-26

## 2022-09-26 PROBLEM — I21.29 ACUTE ST ELEVATION MYOCARDIAL INFARCTION (STEMI) OF LATERAL WALL (HCC): Status: ACTIVE | Noted: 2022-09-26

## 2022-09-26 PROBLEM — I25.10 CAD IN NATIVE ARTERY: Status: ACTIVE | Noted: 2022-09-26

## 2022-09-26 PROBLEM — E78.5 HYPERLIPIDEMIA: Status: ACTIVE | Noted: 2022-09-26

## 2022-09-26 PROBLEM — Z87.891 HISTORY OF SMOKING: Status: ACTIVE | Noted: 2022-09-26

## 2022-09-26 LAB
ABO/RH: NORMAL
ANION GAP SERPL CALCULATED.3IONS-SCNC: 13 MMOL/L (ref 7–16)
ANTIBODY SCREEN: NORMAL
APTT: 35 SEC (ref 24.5–35.1)
BUN BLDV-MCNC: 12 MG/DL (ref 6–20)
CALCIUM SERPL-MCNC: 9.3 MG/DL (ref 8.6–10.2)
CHLORIDE BLD-SCNC: 101 MMOL/L (ref 98–107)
CO2: 23 MMOL/L (ref 22–29)
CREAT SERPL-MCNC: 0.8 MG/DL (ref 0.7–1.2)
EKG ATRIAL RATE: 51 BPM
EKG ATRIAL RATE: 52 BPM
EKG P AXIS: 63 DEGREES
EKG P AXIS: 65 DEGREES
EKG P-R INTERVAL: 176 MS
EKG P-R INTERVAL: 186 MS
EKG Q-T INTERVAL: 436 MS
EKG Q-T INTERVAL: 440 MS
EKG QRS DURATION: 92 MS
EKG QRS DURATION: 94 MS
EKG QTC CALCULATION (BAZETT): 401 MS
EKG QTC CALCULATION (BAZETT): 409 MS
EKG R AXIS: 28 DEGREES
EKG R AXIS: 36 DEGREES
EKG T AXIS: 63 DEGREES
EKG T AXIS: 68 DEGREES
EKG VENTRICULAR RATE: 51 BPM
EKG VENTRICULAR RATE: 52 BPM
GFR AFRICAN AMERICAN: >60
GFR NON-AFRICAN AMERICAN: >60 ML/MIN/1.73
GLUCOSE BLD-MCNC: 125 MG/DL (ref 74–99)
HCT VFR BLD CALC: 45.9 % (ref 37–54)
HEMOGLOBIN: 15.8 G/DL (ref 12.5–16.5)
LV EF: 55 %
LVEF MODALITY: NORMAL
MCH RBC QN AUTO: 32.1 PG (ref 26–35)
MCHC RBC AUTO-ENTMCNC: 34.4 % (ref 32–34.5)
MCV RBC AUTO: 93.3 FL (ref 80–99.9)
PDW BLD-RTO: 13 FL (ref 11.5–15)
PLATELET # BLD: 198 E9/L (ref 130–450)
PMV BLD AUTO: 10.3 FL (ref 7–12)
POC ACT LR: 278 SECONDS
POTASSIUM SERPL-SCNC: 4.1 MMOL/L (ref 3.5–5)
RBC # BLD: 4.92 E12/L (ref 3.8–5.8)
SODIUM BLD-SCNC: 137 MMOL/L (ref 132–146)
TROPONIN, HIGH SENSITIVITY: 13 NG/L (ref 0–11)
TROPONIN, HIGH SENSITIVITY: 265 NG/L (ref 0–11)
TROPONIN, HIGH SENSITIVITY: 644 NG/L (ref 0–11)
WBC # BLD: 5.4 E9/L (ref 4.5–11.5)

## 2022-09-26 PROCEDURE — 6360000002 HC RX W HCPCS

## 2022-09-26 PROCEDURE — 71045 X-RAY EXAM CHEST 1 VIEW: CPT

## 2022-09-26 PROCEDURE — 6370000000 HC RX 637 (ALT 250 FOR IP)

## 2022-09-26 PROCEDURE — 80048 BASIC METABOLIC PNL TOTAL CA: CPT

## 2022-09-26 PROCEDURE — B2151ZZ FLUOROSCOPY OF LEFT HEART USING LOW OSMOLAR CONTRAST: ICD-10-PCS | Performed by: INTERNAL MEDICINE

## 2022-09-26 PROCEDURE — 84484 ASSAY OF TROPONIN QUANT: CPT

## 2022-09-26 PROCEDURE — 027135Z DILATION OF CORONARY ARTERY, TWO ARTERIES WITH TWO DRUG-ELUTING INTRALUMINAL DEVICES, PERCUTANEOUS APPROACH: ICD-10-PCS | Performed by: INTERNAL MEDICINE

## 2022-09-26 PROCEDURE — 93458 L HRT ARTERY/VENTRICLE ANGIO: CPT

## 2022-09-26 PROCEDURE — 86850 RBC ANTIBODY SCREEN: CPT

## 2022-09-26 PROCEDURE — 6360000002 HC RX W HCPCS: Performed by: INTERNAL MEDICINE

## 2022-09-26 PROCEDURE — 2140000000 HC CCU INTERMEDIATE R&B

## 2022-09-26 PROCEDURE — 93306 TTE W/DOPPLER COMPLETE: CPT

## 2022-09-26 PROCEDURE — 96365 THER/PROPH/DIAG IV INF INIT: CPT

## 2022-09-26 PROCEDURE — 96376 TX/PRO/DX INJ SAME DRUG ADON: CPT

## 2022-09-26 PROCEDURE — 86901 BLOOD TYPING SEROLOGIC RH(D): CPT

## 2022-09-26 PROCEDURE — B2111ZZ FLUOROSCOPY OF MULTIPLE CORONARY ARTERIES USING LOW OSMOLAR CONTRAST: ICD-10-PCS | Performed by: INTERNAL MEDICINE

## 2022-09-26 PROCEDURE — 2500000003 HC RX 250 WO HCPCS

## 2022-09-26 PROCEDURE — 93005 ELECTROCARDIOGRAM TRACING: CPT | Performed by: EMERGENCY MEDICINE

## 2022-09-26 PROCEDURE — 6360000002 HC RX W HCPCS: Performed by: EMERGENCY MEDICINE

## 2022-09-26 PROCEDURE — 99291 CRITICAL CARE FIRST HOUR: CPT | Performed by: INTERNAL MEDICINE

## 2022-09-26 PROCEDURE — C1887 CATHETER, GUIDING: HCPCS

## 2022-09-26 PROCEDURE — C1769 GUIDE WIRE: HCPCS

## 2022-09-26 PROCEDURE — 4A023N7 MEASUREMENT OF CARDIAC SAMPLING AND PRESSURE, LEFT HEART, PERCUTANEOUS APPROACH: ICD-10-PCS | Performed by: INTERNAL MEDICINE

## 2022-09-26 PROCEDURE — 93005 ELECTROCARDIOGRAM TRACING: CPT | Performed by: INTERNAL MEDICINE

## 2022-09-26 PROCEDURE — 2709999900 HC NON-CHARGEABLE SUPPLY

## 2022-09-26 PROCEDURE — 93458 L HRT ARTERY/VENTRICLE ANGIO: CPT | Performed by: INTERNAL MEDICINE

## 2022-09-26 PROCEDURE — 85730 THROMBOPLASTIN TIME PARTIAL: CPT

## 2022-09-26 PROCEDURE — 99285 EMERGENCY DEPT VISIT HI MDM: CPT

## 2022-09-26 PROCEDURE — 6370000000 HC RX 637 (ALT 250 FOR IP): Performed by: EMERGENCY MEDICINE

## 2022-09-26 PROCEDURE — 85027 COMPLETE CBC AUTOMATED: CPT

## 2022-09-26 PROCEDURE — 2580000003 HC RX 258: Performed by: INTERNAL MEDICINE

## 2022-09-26 PROCEDURE — C1894 INTRO/SHEATH, NON-LASER: HCPCS

## 2022-09-26 PROCEDURE — 85347 COAGULATION TIME ACTIVATED: CPT

## 2022-09-26 PROCEDURE — 86900 BLOOD TYPING SEROLOGIC ABO: CPT

## 2022-09-26 PROCEDURE — 36415 COLL VENOUS BLD VENIPUNCTURE: CPT

## 2022-09-26 PROCEDURE — 6360000004 HC RX CONTRAST MEDICATION: Performed by: RADIOLOGY

## 2022-09-26 PROCEDURE — 6360000004 HC RX CONTRAST MEDICATION: Performed by: INTERNAL MEDICINE

## 2022-09-26 PROCEDURE — 71275 CT ANGIOGRAPHY CHEST: CPT

## 2022-09-26 PROCEDURE — 2500000003 HC RX 250 WO HCPCS: Performed by: INTERNAL MEDICINE

## 2022-09-26 PROCEDURE — 6370000000 HC RX 637 (ALT 250 FOR IP): Performed by: INTERNAL MEDICINE

## 2022-09-26 RX ORDER — SODIUM CHLORIDE 0.9 % (FLUSH) 0.9 %
5-40 SYRINGE (ML) INJECTION EVERY 12 HOURS SCHEDULED
Status: DISCONTINUED | OUTPATIENT
Start: 2022-09-26 | End: 2022-09-27 | Stop reason: SDUPTHER

## 2022-09-26 RX ORDER — HEPARIN SODIUM 1000 [USP'U]/ML
2000 INJECTION, SOLUTION INTRAVENOUS; SUBCUTANEOUS PRN
Status: DISCONTINUED | OUTPATIENT
Start: 2022-09-26 | End: 2022-09-26 | Stop reason: HOSPADM

## 2022-09-26 RX ORDER — NITROGLYCERIN 0.4 MG/1
0.4 TABLET SUBLINGUAL EVERY 5 MIN PRN
Status: COMPLETED | OUTPATIENT
Start: 2022-09-26 | End: 2022-09-26

## 2022-09-26 RX ORDER — HEPARIN SODIUM 10000 [USP'U]/100ML
5-30 INJECTION, SOLUTION INTRAVENOUS CONTINUOUS
Status: DISCONTINUED | OUTPATIENT
Start: 2022-09-26 | End: 2022-09-26 | Stop reason: HOSPADM

## 2022-09-26 RX ORDER — POLYETHYLENE GLYCOL 3350 17 G/17G
17 POWDER, FOR SOLUTION ORAL DAILY PRN
Status: DISCONTINUED | OUTPATIENT
Start: 2022-09-26 | End: 2022-09-29 | Stop reason: HOSPADM

## 2022-09-26 RX ORDER — ATORVASTATIN CALCIUM 10 MG/1
5 TABLET, FILM COATED ORAL DAILY
Status: ON HOLD | COMMUNITY
End: 2022-09-29 | Stop reason: HOSPADM

## 2022-09-26 RX ORDER — NITROGLYCERIN 20 MG/100ML
5-200 INJECTION INTRAVENOUS CONTINUOUS
Status: DISCONTINUED | OUTPATIENT
Start: 2022-09-26 | End: 2022-09-27

## 2022-09-26 RX ORDER — ENOXAPARIN SODIUM 150 MG/ML
1 INJECTION SUBCUTANEOUS ONCE
Status: COMPLETED | OUTPATIENT
Start: 2022-09-26 | End: 2022-09-26

## 2022-09-26 RX ORDER — HEPARIN SODIUM 1000 [USP'U]/ML
4000 INJECTION, SOLUTION INTRAVENOUS; SUBCUTANEOUS ONCE
Status: COMPLETED | OUTPATIENT
Start: 2022-09-26 | End: 2022-09-26

## 2022-09-26 RX ORDER — SODIUM CHLORIDE 9 MG/ML
INJECTION, SOLUTION INTRAVENOUS PRN
Status: DISCONTINUED | OUTPATIENT
Start: 2022-09-26 | End: 2022-09-26 | Stop reason: SDUPTHER

## 2022-09-26 RX ORDER — ACETAMINOPHEN 325 MG/1
650 TABLET ORAL EVERY 6 HOURS PRN
Status: DISCONTINUED | OUTPATIENT
Start: 2022-09-26 | End: 2022-09-28 | Stop reason: SDUPTHER

## 2022-09-26 RX ORDER — ATORVASTATIN CALCIUM 80 MG/1
80 TABLET, FILM COATED ORAL NIGHTLY
Status: DISCONTINUED | OUTPATIENT
Start: 2022-09-26 | End: 2022-09-29 | Stop reason: HOSPADM

## 2022-09-26 RX ORDER — SODIUM CHLORIDE 0.9 % (FLUSH) 0.9 %
5-40 SYRINGE (ML) INJECTION EVERY 12 HOURS SCHEDULED
Status: DISCONTINUED | OUTPATIENT
Start: 2022-09-26 | End: 2022-09-26 | Stop reason: SDUPTHER

## 2022-09-26 RX ORDER — ACETAMINOPHEN 650 MG/1
650 SUPPOSITORY RECTAL EVERY 6 HOURS PRN
Status: DISCONTINUED | OUTPATIENT
Start: 2022-09-26 | End: 2022-09-29 | Stop reason: HOSPADM

## 2022-09-26 RX ORDER — ASPIRIN 81 MG/1
81 TABLET ORAL DAILY
Status: DISCONTINUED | OUTPATIENT
Start: 2022-09-27 | End: 2022-09-29 | Stop reason: HOSPADM

## 2022-09-26 RX ORDER — MORPHINE SULFATE 2 MG/ML
2 INJECTION, SOLUTION INTRAMUSCULAR; INTRAVENOUS EVERY 4 HOURS PRN
Status: DISCONTINUED | OUTPATIENT
Start: 2022-09-26 | End: 2022-09-28

## 2022-09-26 RX ORDER — SODIUM CHLORIDE 0.9 % (FLUSH) 0.9 %
5-40 SYRINGE (ML) INJECTION PRN
Status: DISCONTINUED | OUTPATIENT
Start: 2022-09-26 | End: 2022-09-27 | Stop reason: SDUPTHER

## 2022-09-26 RX ORDER — ENOXAPARIN SODIUM 100 MG/ML
30 INJECTION SUBCUTANEOUS 2 TIMES DAILY
Status: DISCONTINUED | OUTPATIENT
Start: 2022-09-26 | End: 2022-09-26

## 2022-09-26 RX ORDER — ONDANSETRON 2 MG/ML
4 INJECTION INTRAMUSCULAR; INTRAVENOUS EVERY 6 HOURS PRN
Status: DISCONTINUED | OUTPATIENT
Start: 2022-09-26 | End: 2022-09-29 | Stop reason: HOSPADM

## 2022-09-26 RX ORDER — HEPARIN SODIUM 1000 [USP'U]/ML
4000 INJECTION, SOLUTION INTRAVENOUS; SUBCUTANEOUS PRN
Status: DISCONTINUED | OUTPATIENT
Start: 2022-09-26 | End: 2022-09-26 | Stop reason: HOSPADM

## 2022-09-26 RX ORDER — SODIUM CHLORIDE 9 MG/ML
INJECTION, SOLUTION INTRAVENOUS PRN
Status: DISCONTINUED | OUTPATIENT
Start: 2022-09-26 | End: 2022-09-27 | Stop reason: SDUPTHER

## 2022-09-26 RX ORDER — ONDANSETRON 4 MG/1
4 TABLET, ORALLY DISINTEGRATING ORAL EVERY 8 HOURS PRN
Status: DISCONTINUED | OUTPATIENT
Start: 2022-09-26 | End: 2022-09-29 | Stop reason: HOSPADM

## 2022-09-26 RX ORDER — SODIUM CHLORIDE 0.9 % (FLUSH) 0.9 %
5-40 SYRINGE (ML) INJECTION PRN
Status: DISCONTINUED | OUTPATIENT
Start: 2022-09-26 | End: 2022-09-26 | Stop reason: SDUPTHER

## 2022-09-26 RX ORDER — ASPIRIN 81 MG/1
324 TABLET, CHEWABLE ORAL ONCE
Status: COMPLETED | OUTPATIENT
Start: 2022-09-26 | End: 2022-09-26

## 2022-09-26 RX ORDER — ACETAMINOPHEN 325 MG/1
650 TABLET ORAL EVERY 4 HOURS PRN
Status: DISCONTINUED | OUTPATIENT
Start: 2022-09-26 | End: 2022-09-26 | Stop reason: SDUPTHER

## 2022-09-26 RX ADMIN — SODIUM CHLORIDE, PRESERVATIVE FREE 10 ML: 5 INJECTION INTRAVENOUS at 20:31

## 2022-09-26 RX ADMIN — NITROGLYCERIN 0.4 MG: 0.4 TABLET SUBLINGUAL at 06:13

## 2022-09-26 RX ADMIN — PERFLUTREN 1.65 MG: 6.52 INJECTION, SUSPENSION INTRAVENOUS at 15:50

## 2022-09-26 RX ADMIN — ATORVASTATIN CALCIUM 80 MG: 80 TABLET, FILM COATED ORAL at 20:30

## 2022-09-26 RX ADMIN — NITROGLYCERIN 0.4 MG: 0.4 TABLET SUBLINGUAL at 06:19

## 2022-09-26 RX ADMIN — NITROGLYCERIN 5 MCG/MIN: 20 INJECTION INTRAVENOUS at 09:43

## 2022-09-26 RX ADMIN — HEPARIN SODIUM 4000 UNITS: 1000 INJECTION INTRAVENOUS; SUBCUTANEOUS at 06:11

## 2022-09-26 RX ADMIN — ENOXAPARIN SODIUM 105 MG: 150 INJECTION SUBCUTANEOUS at 20:30

## 2022-09-26 RX ADMIN — METOPROLOL TARTRATE 25 MG: 25 TABLET, FILM COATED ORAL at 06:08

## 2022-09-26 RX ADMIN — NITROGLYCERIN 0.4 MG: 0.4 TABLET SUBLINGUAL at 06:08

## 2022-09-26 RX ADMIN — SODIUM CHLORIDE, PRESERVATIVE FREE 10 ML: 5 INJECTION INTRAVENOUS at 13:14

## 2022-09-26 RX ADMIN — HEPARIN SODIUM AND DEXTROSE 11.9 UNITS/KG/HR: 10000; 5 INJECTION INTRAVENOUS at 06:12

## 2022-09-26 RX ADMIN — ASPIRIN 81 MG CHEWABLE TABLET 324 MG: 81 TABLET CHEWABLE at 06:08

## 2022-09-26 RX ADMIN — IOPAMIDOL 90 ML: 755 INJECTION, SOLUTION INTRAVENOUS at 13:14

## 2022-09-26 RX ADMIN — TICAGRELOR 90 MG: 90 TABLET ORAL at 20:30

## 2022-09-26 ASSESSMENT — ENCOUNTER SYMPTOMS
WHEEZING: 0
ABDOMINAL PAIN: 0
EYE REDNESS: 0
EYE PAIN: 0
VOMITING: 0
COUGH: 0
BACK PAIN: 0
EYE DISCHARGE: 0
NAUSEA: 0
DIARRHEA: 0
SORE THROAT: 0
SINUS PRESSURE: 0

## 2022-09-26 ASSESSMENT — PAIN DESCRIPTION - FREQUENCY: FREQUENCY: INTERMITTENT

## 2022-09-26 ASSESSMENT — PAIN SCALES - GENERAL
PAINLEVEL_OUTOF10: 5
PAINLEVEL_OUTOF10: 7
PAINLEVEL_OUTOF10: 0
PAINLEVEL_OUTOF10: 3
PAINLEVEL_OUTOF10: 2

## 2022-09-26 ASSESSMENT — PAIN DESCRIPTION - DESCRIPTORS
DESCRIPTORS: ACHING;SHARP

## 2022-09-26 ASSESSMENT — LIFESTYLE VARIABLES: HOW OFTEN DO YOU HAVE A DRINK CONTAINING ALCOHOL: NEVER

## 2022-09-26 ASSESSMENT — PAIN - FUNCTIONAL ASSESSMENT: PAIN_FUNCTIONAL_ASSESSMENT: 0-10

## 2022-09-26 ASSESSMENT — PAIN DESCRIPTION - LOCATION
LOCATION: CHEST

## 2022-09-26 ASSESSMENT — PAIN DESCRIPTION - ONSET: ONSET: ON-GOING

## 2022-09-26 NOTE — ED NOTES
Dr Ivy Sorrow in to speak with wife regarding pt condition and transfer.       ROSIE Allison  09/26/22 2415

## 2022-09-26 NOTE — ED PROVIDER NOTES
Department of Emergency Medicine   ED  Provider Note  Admit Date/RoomTime: 9/26/2022  5:46 AM  ED Room: 06/06          History of Present Illness:  9/26/22, Time: 6:13 AM EDT  Chief Complaint   Patient presents with    Chest Pain            Maral Alex is a 64 y.o. male presenting to the ED for chest pain, beginning approximately 3 AM.  The complaint has been constant, severe in severity, and worsened by nothing. Patient describes the pain as a pressure over the center of his chest.  It does not radiate anywhere. He states that he is never had this previously. Patient does endorse a past smoking history. He does endorse increased breathing but otherwise denies any other accompanying symptoms including but not limited to syncope, nausea, vomiting, cough, abdominal pain, lower extremity swelling or pain. Review of Systems   Constitutional:  Negative for chills and fever. HENT:  Negative for ear pain, sinus pressure and sore throat. Eyes:  Negative for pain, discharge and redness. Respiratory:  Negative for cough and wheezing. Cardiovascular:  Positive for chest pain. Negative for leg swelling. Gastrointestinal:  Negative for abdominal pain, diarrhea, nausea and vomiting. Genitourinary:  Negative for dysuria and frequency. Musculoskeletal:  Negative for arthralgias and back pain. Skin:  Negative for rash and wound. Neurological:  Negative for weakness and headaches. Hematological:  Negative for adenopathy. All other systems reviewed and are negative.       --------------------------------------------- PAST HISTORY ---------------------------------------------  Past Medical History:  has a past medical history of Elevated liver enzymes, Hyperlipidemia, Impaired fasting glucose, Obesity (BMI 64.5-96.7), and Umbilical hernia. Past Surgical History:  has a past surgical history that includes eye surgery. Social History:  reports that he has never smoked.  He has never used smokeless 79/92/45   Basic metabolic panel   Result Value Ref Range    Sodium 137 132 - 146 mmol/L    Potassium 4.1 3.5 - 5.0 mmol/L    Chloride 101 98 - 107 mmol/L    CO2 23 22 - 29 mmol/L    Anion Gap 13 7 - 16 mmol/L    Glucose 125 (H) 74 - 99 mg/dL    BUN 12 6 - 20 mg/dL    Creatinine 0.8 0.7 - 1.2 mg/dL    GFR Non-African American >60 >=60 mL/min/1.73    GFR African American >60     Calcium 9.3 8.6 - 10.2 mg/dL   CBC   Result Value Ref Range    WBC 5.4 4.5 - 11.5 E9/L    RBC 4.92 3.80 - 5.80 E12/L    Hemoglobin 15.8 12.5 - 16.5 g/dL    Hematocrit 45.9 37.0 - 54.0 %    MCV 93.3 80.0 - 99.9 fL    MCH 32.1 26.0 - 35.0 pg    MCHC 34.4 32.0 - 34.5 %    RDW 13.0 11.5 - 15.0 fL    Platelets 267 084 - 466 E9/L    MPV 10.3 7.0 - 12.0 fL   Troponin   Result Value Ref Range    Troponin, High Sensitivity 13 (H) 0 - 11 ng/L   APTT   Result Value Ref Range    aPTT 35.0 24.5 - 35.1 sec   EKG 12 Lead   Result Value Ref Range    Ventricular Rate 69 BPM    Atrial Rate 69 BPM    P-R Interval 168 ms    QRS Duration 94 ms    Q-T Interval 404 ms    QTc Calculation (Bazett) 432 ms    P Axis 66 degrees    R Axis 8 degrees    T Axis 23 degrees   ,       RADIOLOGY:  Interpreted by Radiologist unless otherwise specified  XR CHEST PORTABLE   Final Result   No acute disease. RECOMMENDATION:   Careful clinical correlation and follow up recommended. ------------------------- NURSING NOTES AND VITALS REVIEWED ---------------------------   The nursing notes within the ED encounter and vital signs as below have been reviewed by myself  /84   Pulse 73   Temp 97.4 °F (36.3 °C) (Oral)   Resp 24   Wt 185 lb 3.2 oz (84 kg)   SpO2 96%   BMI 26.57 kg/m²     Oxygen Saturation Interpretation: Normal    The patients available past medical records and past encounters were reviewed.         ------------------------------ ED COURSE/MEDICAL DECISION MAKING----------------------  Medications   aspirin chewable tablet 324 mg (324 mg Oral Given 9/26/22 0608)   heparin (porcine) injection 4,000 Units (4,000 Units IntraVENous Given 9/26/22 0611)   nitroGLYCERIN (NITROSTAT) SL tablet 0.4 mg (0.4 mg SubLINGual Given 9/26/22 0619)   metoprolol tartrate (LOPRESSOR) tablet 25 mg (25 mg Oral Given 9/26/22 0608)            Medical Decision Making:     Patient presents with chest pain. EKG did show a STEMI in the Lateral leads. Heart Alert was called. Patient started on heparin and aspirin. Patient's vitals are stable at this time. Patient transferred to [address] [place] [address] for further evaluation and care and catherization for his STEMI. ED Course as of 09/26/22 2219   [facility] Sep 26, 2022   3192 EKG shows normal sinus rhythm with a ventricular rate of 69 bpm.  There is a borderline left axis deviation. There are ST elevations in the far lateral leads with reciprocal depressions in the inferior leads concerning for STEMI. New compared to previous EKG on 1/17/2021. As interpreted by myself the ED physician.   [BB]      ED Course User Index  [BB] Sudhakar Carmona DO        Re-Evaluations:  Patient was reevaluted and continued to be stable. This patient's ED course included: a personal history and physicial examination, multiple bedside re-evaluations, IV medications, cardiac monitoring, continuous pulse oximetry, and complex medical decision making and emergency management    This patient has remained hemodynamically stable during their ED course. Consultations:  Interventional Cardiology      Critical Care: 33 minutes       Counseling: The emergency provider has spoken with the patient and discussed todays results, in addition to providing specific details for the plan of care and counseling regarding the diagnosis and prognosis. Questions are answered at this time and they are agreeable with the plan.       --------------------------------- IMPRESSION AND DISPOSITION ---------------------------------    IMPRESSION  1.  ST

## 2022-09-26 NOTE — ED NOTES
Heparin infusing with no complications. Wife at bedside, pt continues on tele monitor and pulse ox.       ROSIE Allison  09/26/22 4514

## 2022-09-26 NOTE — PROCEDURES
[address]                  [address] [place],  [facility]                            CARDIAC CATHETERIZATION    PATIENT NAME: Lázaro Khalil                       :        1965  MED REC NO:   [id number]                            ROOM:       4709  ACCOUNT NO:   [de-identified]                           ADMIT DATE: 2022  PROVIDER:     Elia Pires MD    DATE OF PROCEDURE:  2022    PROCEDURES PERFORMED:  1. Coronary angiography. 2.  Left heart catheterization. 3.  Left ventriculography. 4.  Attempted PCI to first diagonal branch. Indication #2, score of 9. Indication for PCI is 1 and 9. INDICATION FOR PROCEDURE:  The patient is a 80-year-old male with family  history for CAD, hyperlipidemia who presented to the hospital with a  chest pain. The patient was found to have lateral wall ST elevation MI. The patient was brought to the cath lab for emergent cardiac  catheterization with the intention to intervene as warranted. DESCRIPTION OF PROCEDURE:  After the appropriate informed consent, the  right wrist area was prepped and draped in the usual sterile fashion. A  6-Swiss JL3.5 catheter was used for left coronary angiography in  multiple projections. A 6-Swiss JR4 catheter was used for right  coronary angiography in multiple projections. A 5-Swiss pigtail  catheter was used for left heart catheterization and left  ventriculography in the LIRA 30 projection. ANGIOGRAPHIC FINDINGS:  1. The left main coronary artery arises normally from the left sinus of  Valsalva. It is a good-size vessel with mild ostial disease. It has  also mild distal disease estimated at 10%. It bifurcates into left  anterior descending artery and left circumflex artery. 2.  The left anterior descending artery is a long vessel, extends down  to the apex. The left anterior descending artery gives off a first  small diagonal branch.   The first diagonal branch looks like a large  vessel that has total occlusion. There is some antegrade filling of the  distal segment and the proximal segment of the vessel. The LAD itself  has 20% to 30% disease proximal to the takeoff of the diagonal branch. The rest of the vessel has no significant CAD. 3.  The left circumflex artery is a mid-size vessel that gives off a  first large OM branch that has 80% proximal disease. The second OM  branch has no significant CAD. The distal left circumflex artery is a  small vessel. 4.  The right coronary artery is a large vessel, dominant circulation  that has significant disease involving the mid segment, at worst point  estimated at 90% after the takeoff of an acute marginal branch. Left heart catheterization showed LV pressure of 139/13 with LVEDP of  30. Left ventriculography showed mildly reduced left ventricular  systolic function with estimated ejection fraction of 40% with  anterolateral and apical wall hypokinesis to akinesis. Also, there was  distal lateral wall hypokinesis. There was no significant angiographic  mitral valve regurgitation noted. Of note, attempted PCI was performed prior to LV gram.    After completing coronary angiography, we proceeded with percutaneous  coronary intervention. We used 6-Italian EBU3.5 guiding catheter to  engage the left main coronary artery. We used a BMW wire to cross into  the LAD to protect the LAD and attempts to cross into the diagonal  branch were unsuccessful. We were able to engage the vessel; however,  we were not able to advance the wire beyond the proximal segment. After  several attempts using BMW wire and CHIDI blue wire, the procedure was  abandoned. The patient's chest pain was 2/10, so decision was made to  stop the procedure at this point in time. COMPLICATIONS:  None. ESTIMATED TOTAL BLOOD LOSS:  30 to 40 mL.     MEDICATIONS USED DURING THE PROCEDURE:  We used intra-arterial verapamil  to prevent vasospasm. We used intra-arterial heparin for  anticoagulation. The patient was given 180 mg of Brilinta after  completing coronary angiography. CONSCIOUS SEDATION:  We used Versed and fentanyl for conscious sedation. First dose was given at 07:29, procedure ended at 08:20. There was 50  minutes of direct face-to-face supervision during conscious sedation  administration. Total fluoroscopy time was 17.1 minutes. Total contrast used was 180 mL of Isovue. IMPRESSION:  1. Acute high lateral ST-elevation MI secondary to total occlusion of  first diagonal branch with unsuccessful attempts to PCI due to inability  to advance wire (pre-PCI PHILIPP 0 flow, post-PCI PHILIPP 0 flow; pre-PCI  stenosis 100%, post-PCI attempted PCI was 100% stenosis). 2.  50% mid LAD disease. 3.  80% proximal first OM branch stenosis. 4.  Significant disease involving the mid segment of the RCA estimated  at worst point at 90%. 5.  Mildly reduced left ventricular systolic function with anterolateral  and apical wall hypokinesis. 6.  Elevated LVEDP. RECOMMENDATIONS:  1. Continue current treatment. 2.  We will resume IV heparin after the Vasc Band is removed. 3.  Continue dual-antiplatelet therapy. 4.  The patient will be brought back for PCI to OM branch of RCA  possibly with attempted PCI to diagonal branch.         Sabrina Boston MD    D: 09/26/2022 9:00:57       T: 09/26/2022 10:03:26     [email or web address]  Job#: [id number]     Doc#: [id number]    CC:  Ivy Alex MD

## 2022-09-26 NOTE — H&P
Hospitalist History & Physical      PCP: Gilberto Batista MD    Date of Admission: 9/26/2022    Date of Service: Pt seen/examined on 9/26/2022 and is  admitted to Inpatient with expected LOS greater than two midnights due to medical therapy. Chief Complaint:  had no chief complaint listed for this encounter. History Of Present Illness:    Mr. Arnie Whipple, a 64y.o. year old male  who  has a past medical history of Elevated liver enzymes, Hyperlipidemia, Impaired fasting glucose, Obesity (BMI 86.2-21.4), and Umbilical hernia. 12-year-old male with past medical history significant for hyperlipidemia and impaired fasting glucose who lives with his wife and is independent with his activities. He was feeling perfectly fine until 3 AM on the morning of 9/26 when he woke up because of severe chest pain, which he describes as extreme chest pressure with numbness in his chest and both arms, associated with some respiratory distress. Patient was brought into the ER by his wife. In the ER, patient had unremarkable vitals and labs but he was in acute distress. He was given full dose aspirin 324 mg, and metoprolol 25 mg p.o., and was started on full dose IV heparin. EKG showed ST segment elevation in the far lateral leads and respiratory depression in the inferior leads, indicative of STEMI. Patient was taken to the Cath Lab urgently. As per patient report, no intervention was done and plan is for repeat heart cath on 9/27 for stent placement. I have seen and examined this patient in his room. Patient is alert awake oriented x3 and providing most of the information. Denies any chest pain but still complains of chest discomfort. Denies any shortness of breath, dizziness, nausea, vomiting.       Past Medical History:   Diagnosis Date    Elevated liver enzymes     Hyperlipidemia     Impaired fasting glucose     Obesity (BMI 85.6-98.1)     34    Umbilical hernia        Past Surgical History:   Procedure Laterality Date    EYE SURGERY         Prior to Admission medications    Medication Sig Start Date End Date Taking? Authorizing Provider   atorvastatin (LIPITOR) 10 MG tablet Take 5 mg by mouth daily    Historical Provider, MD         Allergies:  Patient has no known allergies. Social History:    TOBACCO:   reports that he has never smoked. He has never used smokeless tobacco.  ETOH:   reports current alcohol use of about 10.0 standard drinks per week. Family History:    Reviewed in detail and negative for DM, CAD, Cancer, CVA. Positive as follows\"  No family history on file. REVIEW OF SYSTEMS:   Pertinent positives as noted in the HPI. All other systems reviewed and negative. PHYSICAL EXAM:  /87   Pulse (!) 46   Temp 97.5 °F (36.4 °C) (Temporal)   Resp 16   Ht 5' 11\" (1.803 m)   Wt 231 lb 9.6 oz (105.1 kg)   SpO2 98%   BMI 32.30 kg/m²   General appearance: No apparent distress, appears stated age and cooperative. HEENT: Normal cephalic, atraumatic without obvious deformity. Pupils equal, round, and reactive to light. Extra ocular muscles intact. Conjunctivae/corneas clear. Neck: Supple, with full range of motion. No jugular venous distention. Trachea midline. Respiratory: Clear to auscultation bilaterally  Cardiovascular: S1, S2, no murmur  Abdomen: Soft, nontender, nondistended  Musculoskeletal: No clubbing, cyanosis, edema of bilateral lower extremities. Brisk capillary refill. Skin: Normal skin color. No rashes or lesions. Neurologic:  Neurovascularly intact without any focal sensory/motor deficits.  Cranial nerves: II-XII intact, grossly non-focal.  Psychiatric: Alert and oriented, thought content appropriate, normal insight    Reviewed EKG and CXR personally      CBC:   Recent Labs     09/26/22  0555   WBC 5.4   RBC 4.92   HGB 15.8   HCT 45.9   MCV 93.3   RDW 13.0        BMP:   Recent Labs     09/26/22  0555      K 4.1      CO2 23   BUN 12   CREATININE 0.8 LFT:  No results for input(s): PROT, ALB, ALKPHOS, ALT, AST, BILITOT, AMYLASE, LIPASE in the last 72 hours. CE:  No results for input(s): Prateek Mancini in the last 72 hours. PT/INR:   Recent Labs     09/26/22  0555   APTT 35.0     BNP: No results for input(s): BNP in the last 72 hours. ESR:   Lab Results   Component Value Date    SEDRATE 17 (H) 01/19/2021     CRP:   Lab Results   Component Value Date    CRP 2.3 (H) 01/19/2021     D Dimer:   Lab Results   Component Value Date    DDIMER 204 01/19/2021      Folate and B12:   Lab Results   Component Value Date    UVZJWXHP95 440 07/23/2022   , No results found for: FOLATE  Lactic Acid:   Lab Results   Component Value Date    LACTA 1.1 01/19/2021     Thyroid Studies:   Lab Results   Component Value Date    TSH 0.991 07/23/2022    Q7ECJMM 5.2 07/23/2022       Oupatient labs:  Lab Results   Component Value Date    CHOL 150 08/16/2021    TRIG 120 08/16/2021    HDL 32 07/23/2022    LDLCALC 96 07/23/2022    TSH 0.991 07/23/2022    PSA 1.13 07/23/2022    INR 1.1 01/19/2021    LABA1C 6.0 (H) 07/23/2022       Urinalysis:  No results found for: Gilberto Vicente, [address], BACTERIA, RBCUA, BLOODU, SPECGRAV, GLUCOSEU    ASSESSMENT & PLAN:    STEMI  Presented with sudden onset chest pressure and bilateral arm numbness  EKG showed ST segment elevation in the 4 lateral leads  Urgent cardiac cath, report to follow  Given aspirin 324 mg p.o. x1 along with metoprolol 25 mg p.o. x1 in the ER  Further medications as per cardiology    Hyperlipidemia  Continue statins, but increase the dose as per cardiology    Likely prediabetic  Hemoglobin A1c 6.0 on 7/23  Monitor closely for now      Diet: Diet NPO Exceptions are: Sips of Water with Meds  ADULT DIET;  Regular  Code Status: Full Code  Surrogate decision maker confirmed with patient:   Extended Emergency Contact Information  Primary Emergency Contact: Helene Bain  Address: [address] [facility] [address] [place] [address] [address] [facility] Phone: [phone]  Mobile Phone: [phone]  Relation: Spouse   needed? No  Secondary Emergency Contact: Aicha Bocanegra  Relation: Brother/Sister    DVT Prophylaxis: [x]Lovenox []Heparin []PCD [] [address] []Encouraged ambulation  Disposition: [x]Med/Surg [] Intermediate [] ICU/CCU  Admit status:  O[]bservation [x] Inpatient     +++++++++++++++++++++++++++++++++++++++++++++++++  Rachel Patel MD  [facility] Physician - [address] [place] [address]  +++++++++++++++++++++++++++++++++++++++++++++++++  NOTE: This report was transcribed using voice recognition software. Every effort was made to ensure accuracy; however, inadvertent computerized transcription errors may be present.

## 2022-09-26 NOTE — ED NOTES
EMT leaving with pt now     [facility], [address] [place] [address]  09/26/22 5940

## 2022-09-26 NOTE — ED NOTES
Pt on cont tele and pulse ox. EKG completed, copy handed to Dr Roxanna Baumgarten. Dr Roxanna Baumgarten in to assess now.       ROSIE Allison  09/26/22 8136

## 2022-09-27 LAB
ALBUMIN SERPL-MCNC: 4.3 G/DL (ref 3.5–5.2)
ALP BLD-CCNC: 72 U/L (ref 40–129)
ALT SERPL-CCNC: 49 U/L (ref 0–40)
ANION GAP SERPL CALCULATED.3IONS-SCNC: 13 MMOL/L (ref 7–16)
AST SERPL-CCNC: 95 U/L (ref 0–39)
BILIRUB SERPL-MCNC: 0.7 MG/DL (ref 0–1.2)
BUN BLDV-MCNC: 7 MG/DL (ref 6–20)
CALCIUM SERPL-MCNC: 9.4 MG/DL (ref 8.6–10.2)
CHLORIDE BLD-SCNC: 103 MMOL/L (ref 98–107)
CO2: 21 MMOL/L (ref 22–29)
CREAT SERPL-MCNC: 0.7 MG/DL (ref 0.7–1.2)
EKG ATRIAL RATE: 69 BPM
EKG P AXIS: 66 DEGREES
EKG P-R INTERVAL: 168 MS
EKG Q-T INTERVAL: 404 MS
EKG QRS DURATION: 94 MS
EKG QTC CALCULATION (BAZETT): 432 MS
EKG R AXIS: 8 DEGREES
EKG T AXIS: 23 DEGREES
EKG VENTRICULAR RATE: 69 BPM
GFR AFRICAN AMERICAN: >60
GFR NON-AFRICAN AMERICAN: >60 ML/MIN/1.73
GLUCOSE BLD-MCNC: 125 MG/DL (ref 74–99)
HCT VFR BLD CALC: 48.8 % (ref 37–54)
HEMOGLOBIN: 16.8 G/DL (ref 12.5–16.5)
MCH RBC QN AUTO: 32.2 PG (ref 26–35)
MCHC RBC AUTO-ENTMCNC: 34.4 % (ref 32–34.5)
MCV RBC AUTO: 93.5 FL (ref 80–99.9)
PDW BLD-RTO: 13.2 FL (ref 11.5–15)
PLATELET # BLD: 190 E9/L (ref 130–450)
PMV BLD AUTO: 10.4 FL (ref 7–12)
POC ACT LR: 244 SECONDS
POC ACT LR: 251 SECONDS
POC ACT LR: 292 SECONDS
POC ACT LR: 390 SECONDS
POTASSIUM SERPL-SCNC: 4.4 MMOL/L (ref 3.5–5)
RBC # BLD: 5.22 E12/L (ref 3.8–5.8)
SODIUM BLD-SCNC: 137 MMOL/L (ref 132–146)
TOTAL PROTEIN: 7.4 G/DL (ref 6.4–8.3)
WBC # BLD: 10 E9/L (ref 4.5–11.5)

## 2022-09-27 PROCEDURE — C1725 CATH, TRANSLUMIN NON-LASER: HCPCS

## 2022-09-27 PROCEDURE — 85027 COMPLETE CBC AUTOMATED: CPT

## 2022-09-27 PROCEDURE — 85347 COAGULATION TIME ACTIVATED: CPT

## 2022-09-27 PROCEDURE — 93005 ELECTROCARDIOGRAM TRACING: CPT | Performed by: INTERNAL MEDICINE

## 2022-09-27 PROCEDURE — C1887 CATHETER, GUIDING: HCPCS

## 2022-09-27 PROCEDURE — C1769 GUIDE WIRE: HCPCS

## 2022-09-27 PROCEDURE — 2140000000 HC CCU INTERMEDIATE R&B

## 2022-09-27 PROCEDURE — 92928 PRQ TCAT PLMT NTRAC ST 1 LES: CPT

## 2022-09-27 PROCEDURE — 2580000003 HC RX 258: Performed by: INTERNAL MEDICINE

## 2022-09-27 PROCEDURE — C1894 INTRO/SHEATH, NON-LASER: HCPCS

## 2022-09-27 PROCEDURE — 36415 COLL VENOUS BLD VENIPUNCTURE: CPT

## 2022-09-27 PROCEDURE — 92928 PRQ TCAT PLMT NTRAC ST 1 LES: CPT | Performed by: INTERNAL MEDICINE

## 2022-09-27 PROCEDURE — 99232 SBSQ HOSP IP/OBS MODERATE 35: CPT | Performed by: INTERNAL MEDICINE

## 2022-09-27 PROCEDURE — C1874 STENT, COATED/COV W/DEL SYS: HCPCS

## 2022-09-27 PROCEDURE — 6360000002 HC RX W HCPCS

## 2022-09-27 PROCEDURE — 6370000000 HC RX 637 (ALT 250 FOR IP): Performed by: INTERNAL MEDICINE

## 2022-09-27 PROCEDURE — 80053 COMPREHEN METABOLIC PANEL: CPT

## 2022-09-27 PROCEDURE — 2500000003 HC RX 250 WO HCPCS

## 2022-09-27 PROCEDURE — 2709999900 HC NON-CHARGEABLE SUPPLY

## 2022-09-27 RX ORDER — SODIUM CHLORIDE 0.9 % (FLUSH) 0.9 %
5-40 SYRINGE (ML) INJECTION EVERY 12 HOURS SCHEDULED
Status: DISCONTINUED | OUTPATIENT
Start: 2022-09-27 | End: 2022-09-28 | Stop reason: SDUPTHER

## 2022-09-27 RX ORDER — SODIUM CHLORIDE 9 MG/ML
INJECTION, SOLUTION INTRAVENOUS PRN
Status: DISCONTINUED | OUTPATIENT
Start: 2022-09-27 | End: 2022-09-28 | Stop reason: SDUPTHER

## 2022-09-27 RX ORDER — ACETAMINOPHEN 325 MG/1
650 TABLET ORAL EVERY 4 HOURS PRN
Status: DISCONTINUED | OUTPATIENT
Start: 2022-09-27 | End: 2022-09-27 | Stop reason: SDUPTHER

## 2022-09-27 RX ORDER — SODIUM CHLORIDE 0.9 % (FLUSH) 0.9 %
5-40 SYRINGE (ML) INJECTION PRN
Status: DISCONTINUED | OUTPATIENT
Start: 2022-09-27 | End: 2022-09-28 | Stop reason: SDUPTHER

## 2022-09-27 RX ADMIN — SODIUM CHLORIDE, PRESERVATIVE FREE 10 ML: 5 INJECTION INTRAVENOUS at 22:32

## 2022-09-27 RX ADMIN — ATORVASTATIN CALCIUM 80 MG: 80 TABLET, FILM COATED ORAL at 22:32

## 2022-09-27 RX ADMIN — ASPIRIN 81 MG: 81 TABLET, COATED ORAL at 07:42

## 2022-09-27 RX ADMIN — TICAGRELOR 90 MG: 90 TABLET ORAL at 22:32

## 2022-09-27 RX ADMIN — TICAGRELOR 90 MG: 90 TABLET ORAL at 07:42

## 2022-09-27 RX ADMIN — SODIUM CHLORIDE, PRESERVATIVE FREE 10 ML: 5 INJECTION INTRAVENOUS at 07:42

## 2022-09-27 ASSESSMENT — PAIN SCALES - GENERAL
PAINLEVEL_OUTOF10: 0

## 2022-09-27 NOTE — PROCEDURES
[address]                  [address] [id number],  [facility]                            CARDIAC CATHETERIZATION    PATIENT NAME: Michelle Mosley                       :        1965  MED REC NO:   [id number]                            ROOM:       5273  ACCOUNT NO:   [de-identified]                           ADMIT DATE: 2022  PROVIDER:     Elias Nelson MD    DATE OF PROCEDURE:  2022    PROCEDURES:  1. PCI to first OM branch with deployment of 2.25 x 20 mm [place] Resolute  drug-eluting stent. 2.  Percutaneous coronary intervention with deployment of 2.25 x 22 mm  [place] Resolute drug-eluting stent in first diagonal branch. 3.  Conscious sedation using Versed and fentanyl. Indication #4 with score of 8. Indication for PCI 16 and 8. INDICATION FOR PROCEDURE:  The patient is a 80-year-old male who  presented to the hospital with acute MI. The patient had cardiac  catheterization yesterday. He had what it looked like chronic total  occlusion of diagonal branch and he had significant disease of the left  circumflex artery and RCA. The patient was brought today for PCI to the  left circumflex artery and PCI to diagonal branch. DESCRIPTION OF THE PROCEDURE:  After the appropriate informed consent,  the right wrist area was prepped and draped in the usual sterile  fashion. A timeout was called. The right wrist area was locally  anesthetized with 2 mL of 2% lidocaine. A 21-gauge needle was used to  access the right radial artery. A 6-[place]ud[place] introducer sheath was used  to cannulate the right radial artery. A 6-[place] EBU 3.5 guiding  catheter was used to engage the left main coronary artery. A BMW wire  was used to cross into the OM branch. The lesion was predilated using  2.0 x 12 mm balloon; however, attempted stent were met with resistance. Even with the use of 6-B[place]ian GuideLiner support, we were not able to  advance the stent. Then, the lesion was predilated again using 2.0 x 4  mm complaint balloon, then 2.0 x 4 mm noncompliant balloon. Then we  were able to advance a 2.25 x 22 mm [place] Resolute drug-eluting stent  into the proximal and ostial segments of the first OM branch. Then the  stent was postdilated using 2.25 x 12 mm noncompliant balloon. Followup  angiography showed 0% residual stenosis and PHILIPP-3 flow distally. Then  after that, attention was directed to the first diagonal branch. We  used BMW wire to wire the LAD. Attempts to cross the diagonal branch  was challenging. A Runthrough wire did not go through. Then we were  able to cross the lesion using  50 wire. Then the lesion was  predilated using 1.5 x 8 mm balloon, then 2.0 x 12 mm balloon, then the  lesion was stented using 2.25 x 22 mm [place] Resolute drug-eluting stent,  postdilated using 2.25 x 12 mm noncompliant balloon. Followup  angiography showed 0% residual stenosis and PHILIPP-3 flow distally. At the end of the procedure, the catheter and the wire were removed from  the body. The sheath was removed from the body, and the Vasc band was  applied to the right wrist area with effective hemostasis. COMPLICATIONS:  None. ESTIMATED TOTAL BLOOD LOSS:  60-70 mL. MEDICATIONS USED DURING THE PROCEDURE:  We used intravenous heparin for  anticoagulation. Of note, the patient is on aspirin and Brilinta. CONSCIOUS SEDATION:  We used Versed and fentanyl for conscious sedation. First dose was given at 09:10, the procedure ended at 11:10. There was  120 minutes of direct face-to-face supervision during conscious sedation  administration. TOTAL FLUOROSCOPY TIME:  32.2 minutes. TOTAL CONTRAST USED:  210 mL of Isovue. IMPRESSION:  1.   Percutaneous coronary intervention with a deployment of 2.25 x 22 mm  [place] Resolute drug-eluting in ostial and proximal first OM branch  (pre-PCI PHILIPP-3 flow, post-PCI PHILIPP-3 flow, pre-PCI stenosis 80%,  post-PCI stenosis 0%). 2.  Percutaneous coronary intervention with deployment of 2.25 x 20 mm  [place] Resolute drug-eluting stent in the first diagonal branch (pre-PCI  PHILIPP-0 flow, post-PCI PHILIPP-3 flow, pre-PCI stenosis 100%, post-PCI  stenosis 0%). RECOMMENDATIONS:  1. Aspirin for life. 2.  P2Y12 inhibitors for at least a year, preferably longer. 3.  Aggressive coronary artery disease risk factor modifications.   4.  The patient will be brought back for staged PCI to Jacinta Pearce MD    D: 09/27/2022 11:33:23       T: 09/27/2022 12:35:05     [email or web address]  Job#: [id number]     Doc#: [id number]    CC:

## 2022-09-27 NOTE — CARE COORDINATION
9/27/22 Transition of Care: patient is currently in the cath lab. He was admitted due to c/o chest pain and found to have a STEMI. Will follow for any home going needs once he returns from the cath lab and is recovering.  Electronically signed by Nany De RN CM on 9/27/2022 at 1:41 PM

## 2022-09-27 NOTE — PROGRESS NOTES
Hospitalist Progress Note      SYNOPSIS: Patient admitted on 2022 for STEMI (ST elevation myocardial infarction) ([facility] [address] He was admitted with a complaint of severe chest pain, with associated numbness in his chest and both arms. He was brought into the ED and EKG showed ST segment elevation in the far lateral leads. He was admitted and managed for ST elevation MI. He had cardiac cath on 2022 but he could not have PCI. He therefore had repeat cardiac cath on 2022 with PCI and DANIEL to the first diagonal branch. SUBJECTIVE:    Patient seen and examined. He had repeat cardiac cath today. He had no other complaints and review of systems is otherwise negative. Records reviewed. Temp (24hrs), Av.3 °F (36.3 °C), Min:96.6 °F (35.9 °C), Max:98.1 °F (36.7 °C)    DIET: ADULT DIET; Regular; Low Fat/Low Chol/High Fiber/2 gm Na  CODE: Full Code    Intake/Output Summary (Last 24 hours) at 2022 1701  Last data filed at 2022 1337  Gross per 24 hour   Intake 700 ml   Output 725 ml   Net -25 ml       OBJECTIVE:    /78   Pulse 72   Temp 96.9 °F (36.1 °C) (Temporal)   Resp 18   Ht 5' 11\" (1.803 m)   Wt 231 lb 9.6 oz (105.1 kg)   SpO2 94%   BMI 32.30 kg/m²     General appearance: No apparent distress, appears stated age and cooperative. HEENT:  Conjunctivae/corneas clear. Neck: Supple. No jugular venous distention. Respiratory: Clear to auscultation bilaterally, normal respiratory effort  Cardiovascular: Regular rate rhythm, normal S1-S2  Abdomen: Soft, nontender, nondistended  Musculoskeletal: No clubbing, cyanosis, no bilateral lower extremity edema. Brisk capillary refill. Skin:  No rashes  on visible skin  Neurologic: awake, alert and following commands     ASSESSMENT:  #STEMI  -s/p cardiac cath.  Had repeat cardiac cath with DANIEL to first diagonal branch  -on aspirin and statin as well as brilinta and metoprolol  -2D echo showed EF of 55% and apical hypokinesis and mild septa hypertrophy, suggestive of diastolic dysfunction    #Hyperlipidemia: on high intensity statin    #Impaired glucose tolerance  -A1C from July 23rd was 6.  -ISS. Counseled on healthy DASH diet. DVT prophylaxis:SCDs     DISPOSITION: to be determined    Medications:  REVIEWED DAILY    Infusion Medications    sodium chloride       Scheduled Medications    sodium chloride flush  5-40 mL IntraVENous 2 times per day    aspirin  81 mg Oral Daily    ticagrelor  90 mg Oral BID    atorvastatin  80 mg Oral Nightly     PRN Meds: sodium chloride flush, sodium chloride, morphine, ondansetron **OR** ondansetron, polyethylene glycol, acetaminophen **OR** acetaminophen    Labs:     Recent Labs     09/26/22  0555 09/27/22 0459   WBC 5.4 10.0   HGB 15.8 16.8*   HCT 45.9 48.8    190       Recent Labs     09/26/22 0555 09/27/22 0459    137   K 4.1 4.4    103   CO2 23 21*   BUN 12 7   CREATININE 0.8 0.7   CALCIUM 9.3 9.4       Recent Labs     09/27/22 0459   PROT 7.4   ALKPHOS 72   ALT 49*   AST 95*   BILITOT 0.7       No results for input(s): INR in the last 72 hours. No results for input(s): Curlie Lat in the last 72 hours. Chronic labs:    Lab Results   Component Value Date    CHOL 150 08/16/2021    TRIG 120 08/16/2021    HDL 32 07/23/2022    LDLCALC 96 07/23/2022    TSH 0.991 07/23/2022    PSA 1.13 07/23/2022    INR 1.1 01/19/2021    LABA1C 6.0 (H) 07/23/2022       Radiology: REVIEWED DAILY    +++++++++++++++++++++++++++++++++++++++++++++++++  Ralph Mejia MD  [facility] - [address] [place] [address], [place]  +++++++++++++++++++++++++++++++++++++++++++++++++  NOTE: This report was transcribed using voice recognition software. Every effort was made to ensure accuracy; however, inadvertent computerized transcription errors may be present.

## 2022-09-27 NOTE — PATIENT CARE CONFERENCE
P Quality Flow/Interdisciplinary Rounds Progress Note        Quality Flow Rounds held on September 27, 2022    Disciplines Attending:  Bedside Nurse, , , and Nursing Unit Leadership    Megan Mendenhall was admitted on 9/26/2022  8:46 AM    Anticipated Discharge Date:       Disposition:    Dao Score:  Dao Scale Score: 21    Readmission Risk              Risk of Unplanned Readmission:  7           Discussed patient goal for the day, patient clinical progression, and barriers to discharge.   The following Goal(s) of the Day/Commitment(s) have been identified:  Labs - Report Results      Manny Shepherd RN  September 27, 2022

## 2022-09-27 NOTE — CONSULTS
[facility] Cardiology consult  Dr. Arthur Powers      Reason for Consult: Acute ST elevation MI  Requesting Physician: Dr. Patt Guardado: Chest pain  History Obtained From: patient  HISTORY OF PRESENT ILLNESS:   Patient is 64years old male with history of hyperlipidemia, presented to the hospital complaining of chest pain, EKG showed ST elevation in the high lateral leads, I was contacted since I was the interventional cardiologist on-call, patient was Anish Palomares for emergent cardiac catheterization. Chest pain started couple of hours prior to admission, woke him up from sleep, was significant enough that prompted him to seek medical attention, no alleviating or aggravating factors, no previous similar episodes, associated symptoms included shortness of breath, denies any lightheadedness or dizziness, no palpitations, no pedal edema, no PND,, no orthopnea, no syncope.       Past Medical History:   Diagnosis Date    Elevated liver enzymes     Hyperlipidemia     Impaired fasting glucose     Obesity (BMI 86.5-19.5)     34    Umbilical hernia        Past Surgical History:   Procedure Laterality Date    EYE SURGERY           Current Facility-Administered Medications:     sodium chloride flush 0.9 % injection 5-40 mL, 5-40 mL, IntraVENous, 2 times per day, Arthur Powers MD    sodium chloride flush 0.9 % injection 5-40 mL, 5-40 mL, IntraVENous, PRN, Arthur Powers MD, 10 mL at 09/26/22 1314    0.9 % sodium chloride infusion, , IntraVENous, PRN, Arthur Powers MD    [START ON 9/27/2022] aspirin EC tablet 81 mg, 81 mg, Oral, Daily, Arthur Powers MD    nitroGLYCERIN 50 mg in dextrose 5% 250 mL infusion, 5-200 mcg/min, IntraVENous, Continuous, Arthur Powers MD, Stopped at 09/26/22 1030    morphine (PF) injection 2 mg, 2 mg, IntraVENous, Q4H PRN, Arthur Powers MD    ondansetron (ZOFRAN-ODT) disintegrating tablet 4 mg, 4 mg, Oral, Q8H PRN **OR** ondansetron (ZOFRAN) injection 4 mg, 4 mg, IntraVENous, Q6H PRN, Nitesh Munoz, MD    polyethylene glycol (GLYCOLAX) packet 17 g, 17 g, Oral, Daily PRN, Liat De Souza MD    acetaminophen (TYLENOL) tablet 650 mg, 650 mg, Oral, Q6H PRN **OR** acetaminophen (TYLENOL) suppository 650 mg, 650 mg, Rectal, Q6H PRN, Liat De Souza MD    ticagrelor (BRILINTA) tablet 90 mg, 90 mg, Oral, BID, Dola Siemens, MD    atorvastatin (LIPITOR) tablet 80 mg, 80 mg, Oral, Nightly, Dola Siemens, MD    enoxaparin (LOVENOX) injection 105 mg, 1 mg/kg, SubCUTAneous, Once, Dola Siemens, MD    Allergies as of 09/26/2022    (No Known Allergies)       Social History     Socioeconomic History    Marital status:      Spouse name: Not on file    Number of children: Not on file    Years of education: Not on file    Highest education level: Not on file   Occupational History    Not on file   Tobacco Use    Smoking status: Never    Smokeless tobacco: Never   Substance and Sexual Activity    Alcohol use: Yes     Alcohol/week: 10.0 standard drinks     Types: 10 Cans of beer per week     Comment: weekly    Drug use: No    Sexual activity: Not on file   Other Topics Concern    Not on file   Social History Narrative    Not on file     Social Determinants of Health     Financial Resource Strain: Not on file   Food Insecurity: Not on file   Transportation Needs: Not on file   Physical Activity: Not on file   Stress: Not on file   Social Connections: Not on file   Intimate Partner Violence: Not on file   Housing Stability: Not on file       No family history on file. REVIEW OF SYSTEMS:   Limited due to the acuity of illness    PHYSICAL EXAM:   CONSTITUTIONAL:  awake, alert, cooperative, mild apparent distress, and appears stated age  EYES:  lids and lashes normal and pupils equal, round and reactive to light, anicteric sclerae  HEAD:  normocepalic, without obvious abnormality, atraumatic, pink, moist mucous membranes.   NECK:  Supple, symmetrical, trachea midline, no adenopathy, thyroid symmetric, not enlarged and no tenderness, skin normal  HEMATOLOGIC/LYMPHATICS:  no cervical lymphadenopathy and no supraclavicular lymphadenopathy  LUNGS:  No increased work of breathing,  No accessory muscle use or intercostal retractions, good air exchange, clear to auscultation bilaterally, no crackles or wheezing  CARDIOVASCULAR:  Normal apical impulse, regular rate and rhythm, normal S1 and S2, no S3 or S4, 3/6 systolic murmur at the apex, no JVD, no carotid bruit, no pedal edema, good carotid upstroke bilaterally. ABDOMEN:  Soft, nontender, no masses, no hepatomegaly or splenomegaly, BS+  CHEST: nontender to palpation, expands symmetrically  MUSCULOSKELETAL:  No clubbing no cyanosis. there is no redness, warmth, or swelling of the joints  NEUROLOGIC:  Alert, awake,oriented x3.   SKIN:  no bruising or bleeding, normal skin color, texture, turgor and no redness, warmth, or swelling    BP (!) 141/94   Pulse 60   Temp 97.5 °F (36.4 °C) (Temporal)   Resp 16   Ht 5' 11\" (1.803 m)   Wt 231 lb 9.6 oz (105.1 kg)   SpO2 98%   BMI 32.30 kg/m²     DATA:   I personally reviewed the admission EKG with the following interpretation: Sinus rhythm with ST elevation in 1 and aVL with reciprocal changes in the inferior leads    ECHO: Not performed to date  Stress Test: Not performed to date  Angiography: Not performed to date  Cardiology Labs: BMP:    Lab Results   Component Value Date/Time     09/26/2022 05:55 AM    K 4.1 09/26/2022 05:55 AM    K 4.4 01/18/2021 08:47 AM     09/26/2022 05:55 AM    CO2 23 09/26/2022 05:55 AM    BUN 12 09/26/2022 05:55 AM     CMP:    Lab Results   Component Value Date/Time     09/26/2022 05:55 AM    K 4.1 09/26/2022 05:55 AM    K 4.4 01/18/2021 08:47 AM     09/26/2022 05:55 AM    CO2 23 09/26/2022 05:55 AM    BUN 12 09/26/2022 05:55 AM    PROT 7.4 07/23/2022 08:03 AM     CBC:    Lab Results   Component Value Date/Time    WBC 5.4 09/26/2022 05:55 AM    RBC 4.92 09/26/2022 05:55 AM    HGB 15.8 09/26/2022 05:55 AM HCT 45.9 09/26/2022 05:55 AM    MCV 93.3 09/26/2022 05:55 AM    RDW 13.0 09/26/2022 05:55 AM     09/26/2022 05:55 AM     PT/INR:  No results found for: PTINR  PT/INR Warfarin:  No components found for: PTPATWAR, PTINRWAR  PTT:    Lab Results   Component Value Date/Time    APTT 35.0 09/26/2022 05:55 AM     PTT Heparin:  No components found for: APTTHEP  Magnesium:  No results found for: MG  TSH:    Lab Results   Component Value Date/Time    TSH 0.991 07/23/2022 08:03 AM     TROPONIN:  No components found for: TROP  BNP:  No results found for: BNP  FASTING LIPID PANEL:    Lab Results   Component Value Date/Time    CHOL 150 08/16/2021 09:30 AM    HDL 32 07/23/2022 08:03 AM    TRIG 120 08/16/2021 09:30 AM     CTA CHEST W CONTRAST   Final Result   1. Limited CT pulmonary angiogram due to poor arterial enhancement. No   central pulmonary embolism is seen. Smaller peripheral emboli cannot be   excluded. 2. Minimal dependent atelectasis or scarring in the lungs. No evidence of   pneumonia. 3. Enlarged right paratracheal and infra carinal lymph nodes of unclear   etiology. These lymph nodes are grossly stable in size as of this CT a of   the chest from 01/17/2021. However, on the previous study, there was   evidence of active pneumonia, which is not currently visualized. Clinical   correlation and follow-up CT of the chest is recommended in 3-6 months. RECOMMENDATIONS:   Unavailable           I have personally reviewed the laboratory, cardiac diagnostic and radiographic testing as outlined above:      IMPRESSION:  1. Acute lateral ST elevation MI: Will need emergent cardiac catheterization, procedure, alternatives, risks, benefits, discussed with him, is willing to proceed. 2.  Hyperlipidemia: On statin  3. Family history of early CAD  3. Remote history of smoking    RECOMMENDATIONS:   1.   Emergent cardiac catheterization: Indications, procedures, risks and benefits of cardiac catheterization were discussed with the patient with risks including, but not limited to, infection, vessel damage, bleeding, dye allergy, nephrotoxicity, dysrhythmia, MI, CVA and death as well as the potential need for emergent cardiac surgery. Patient verbalized understanding and is agreeable to proceed. 2.  IV heparin  3.  Echocardiogram  4. Further cardiac recommendations will be forthcoming pending his clinical course and the findings of his cardiac catheterization    I have reviewed my findings and recommendations with patient    Thank you for the consult  Electronically signed by Zoila Gramajo MD on 9/26/2022 at 8:05 PM  NOTE: This report was transcribed using voice recognition software.  Every effort was made to ensure accuracy; however, inadvertent computerized transcription errors may be present   Late entry note

## 2022-09-28 LAB
ANION GAP SERPL CALCULATED.3IONS-SCNC: 16 MMOL/L (ref 7–16)
BUN BLDV-MCNC: 9 MG/DL (ref 6–20)
CALCIUM SERPL-MCNC: 9.6 MG/DL (ref 8.6–10.2)
CHLORIDE BLD-SCNC: 103 MMOL/L (ref 98–107)
CO2: 20 MMOL/L (ref 22–29)
CREAT SERPL-MCNC: 0.7 MG/DL (ref 0.7–1.2)
EKG ATRIAL RATE: 55 BPM
EKG ATRIAL RATE: 55 BPM
EKG P AXIS: 62 DEGREES
EKG P AXIS: 66 DEGREES
EKG P-R INTERVAL: 166 MS
EKG P-R INTERVAL: 188 MS
EKG Q-T INTERVAL: 432 MS
EKG Q-T INTERVAL: 456 MS
EKG QRS DURATION: 104 MS
EKG QRS DURATION: 104 MS
EKG QTC CALCULATION (BAZETT): 413 MS
EKG QTC CALCULATION (BAZETT): 436 MS
EKG R AXIS: 41 DEGREES
EKG R AXIS: 46 DEGREES
EKG T AXIS: 103 DEGREES
EKG T AXIS: 98 DEGREES
EKG VENTRICULAR RATE: 55 BPM
EKG VENTRICULAR RATE: 55 BPM
GFR AFRICAN AMERICAN: >60
GFR NON-AFRICAN AMERICAN: >60 ML/MIN/1.73
GLUCOSE BLD-MCNC: 121 MG/DL (ref 74–99)
HCT VFR BLD CALC: 47 % (ref 37–54)
HEMOGLOBIN: 16.3 G/DL (ref 12.5–16.5)
MCH RBC QN AUTO: 32.5 PG (ref 26–35)
MCHC RBC AUTO-ENTMCNC: 34.7 % (ref 32–34.5)
MCV RBC AUTO: 93.6 FL (ref 80–99.9)
PDW BLD-RTO: 13.4 FL (ref 11.5–15)
PLATELET # BLD: 191 E9/L (ref 130–450)
PMV BLD AUTO: 10.1 FL (ref 7–12)
POC ACT LR: 218 SECONDS
POC ACT LR: 331 SECONDS
POC ACT LR: >400 SECONDS
POTASSIUM SERPL-SCNC: 4.4 MMOL/L (ref 3.5–5)
RBC # BLD: 5.02 E12/L (ref 3.8–5.8)
SODIUM BLD-SCNC: 139 MMOL/L (ref 132–146)
WBC # BLD: 9.7 E9/L (ref 4.5–11.5)

## 2022-09-28 PROCEDURE — 2140000000 HC CCU INTERMEDIATE R&B

## 2022-09-28 PROCEDURE — 92928 PRQ TCAT PLMT NTRAC ST 1 LES: CPT

## 2022-09-28 PROCEDURE — 6360000002 HC RX W HCPCS

## 2022-09-28 PROCEDURE — 2500000003 HC RX 250 WO HCPCS

## 2022-09-28 PROCEDURE — 6370000000 HC RX 637 (ALT 250 FOR IP): Performed by: INTERNAL MEDICINE

## 2022-09-28 PROCEDURE — 85027 COMPLETE CBC AUTOMATED: CPT

## 2022-09-28 PROCEDURE — C1887 CATHETER, GUIDING: HCPCS

## 2022-09-28 PROCEDURE — 2580000003 HC RX 258: Performed by: INTERNAL MEDICINE

## 2022-09-28 PROCEDURE — 36415 COLL VENOUS BLD VENIPUNCTURE: CPT

## 2022-09-28 PROCEDURE — C1769 GUIDE WIRE: HCPCS

## 2022-09-28 PROCEDURE — C1725 CATH, TRANSLUMIN NON-LASER: HCPCS

## 2022-09-28 PROCEDURE — 2709999900 HC NON-CHARGEABLE SUPPLY

## 2022-09-28 PROCEDURE — 93005 ELECTROCARDIOGRAM TRACING: CPT | Performed by: INTERNAL MEDICINE

## 2022-09-28 PROCEDURE — 80048 BASIC METABOLIC PNL TOTAL CA: CPT

## 2022-09-28 PROCEDURE — 85347 COAGULATION TIME ACTIVATED: CPT

## 2022-09-28 PROCEDURE — 99232 SBSQ HOSP IP/OBS MODERATE 35: CPT | Performed by: INTERNAL MEDICINE

## 2022-09-28 PROCEDURE — 92928 PRQ TCAT PLMT NTRAC ST 1 LES: CPT | Performed by: INTERNAL MEDICINE

## 2022-09-28 PROCEDURE — C1874 STENT, COATED/COV W/DEL SYS: HCPCS

## 2022-09-28 PROCEDURE — C1894 INTRO/SHEATH, NON-LASER: HCPCS

## 2022-09-28 RX ORDER — SODIUM CHLORIDE 0.9 % (FLUSH) 0.9 %
5-40 SYRINGE (ML) INJECTION PRN
Status: DISCONTINUED | OUTPATIENT
Start: 2022-09-28 | End: 2022-09-29 | Stop reason: HOSPADM

## 2022-09-28 RX ORDER — SODIUM CHLORIDE 9 MG/ML
INJECTION, SOLUTION INTRAVENOUS CONTINUOUS
Status: ACTIVE | OUTPATIENT
Start: 2022-09-28 | End: 2022-09-29

## 2022-09-28 RX ORDER — SODIUM CHLORIDE 9 MG/ML
INJECTION, SOLUTION INTRAVENOUS PRN
Status: DISCONTINUED | OUTPATIENT
Start: 2022-09-28 | End: 2022-09-29 | Stop reason: HOSPADM

## 2022-09-28 RX ORDER — ACETAMINOPHEN 325 MG/1
650 TABLET ORAL EVERY 4 HOURS PRN
Status: DISCONTINUED | OUTPATIENT
Start: 2022-09-28 | End: 2022-09-29 | Stop reason: HOSPADM

## 2022-09-28 RX ORDER — SODIUM CHLORIDE 0.9 % (FLUSH) 0.9 %
5-40 SYRINGE (ML) INJECTION EVERY 12 HOURS SCHEDULED
Status: DISCONTINUED | OUTPATIENT
Start: 2022-09-28 | End: 2022-09-29 | Stop reason: HOSPADM

## 2022-09-28 RX ORDER — ALPRAZOLAM 0.25 MG/1
0.5 TABLET ORAL 3 TIMES DAILY PRN
Status: DISCONTINUED | OUTPATIENT
Start: 2022-09-28 | End: 2022-09-29 | Stop reason: HOSPADM

## 2022-09-28 RX ADMIN — SODIUM CHLORIDE: 9 INJECTION, SOLUTION INTRAVENOUS at 13:34

## 2022-09-28 RX ADMIN — ATORVASTATIN CALCIUM 80 MG: 80 TABLET, FILM COATED ORAL at 21:30

## 2022-09-28 RX ADMIN — ASPIRIN 81 MG: 81 TABLET, COATED ORAL at 08:15

## 2022-09-28 RX ADMIN — SODIUM CHLORIDE, PRESERVATIVE FREE 10 ML: 5 INJECTION INTRAVENOUS at 08:15

## 2022-09-28 RX ADMIN — TICAGRELOR 90 MG: 90 TABLET ORAL at 08:15

## 2022-09-28 RX ADMIN — ACETAMINOPHEN 650 MG: 325 TABLET, FILM COATED ORAL at 13:15

## 2022-09-28 RX ADMIN — TICAGRELOR 90 MG: 90 TABLET ORAL at 21:30

## 2022-09-28 RX ADMIN — SODIUM CHLORIDE, PRESERVATIVE FREE 10 ML: 5 INJECTION INTRAVENOUS at 21:30

## 2022-09-28 ASSESSMENT — PAIN SCALES - GENERAL
PAINLEVEL_OUTOF10: 3
PAINLEVEL_OUTOF10: 5
PAINLEVEL_OUTOF10: 0

## 2022-09-28 ASSESSMENT — PAIN DESCRIPTION - LOCATION: LOCATION: HEAD

## 2022-09-28 ASSESSMENT — PAIN SCALES - WONG BAKER: WONGBAKER_NUMERICALRESPONSE: 0

## 2022-09-28 ASSESSMENT — PAIN DESCRIPTION - DESCRIPTORS: DESCRIPTORS: ACHING

## 2022-09-28 ASSESSMENT — PAIN - FUNCTIONAL ASSESSMENT: PAIN_FUNCTIONAL_ASSESSMENT: ACTIVITIES ARE NOT PREVENTED

## 2022-09-28 NOTE — PROGRESS NOTES
Patient is seen in follow-up for ST elevation MI    Subjective:     Mr. Kaveh Cohen feels okay today. Laying in bed no apparent distress    ROS:  CONSTITUTIONAL:  negative for  fevers, chills  HEENT:  negative for earaches, nasal congestion and epistaxis  RESPIRATORY:  negative for  dry cough, cough with sputum,wheezing and hemoptysis  GASTROINTESTINAL:  negative for nausea, vomiting  MUSCULOSKELETAL:  negative for  myalgias, arthralgias  NEUROLOGICAL:  negative for visual disturbance, dysphagia    Medication side effects: none    Scheduled Meds:   sodium chloride flush  5-40 mL IntraVENous 2 times per day    aspirin  81 mg Oral Daily    ticagrelor  90 mg Oral BID    atorvastatin  80 mg Oral Nightly     Continuous Infusions:   sodium chloride      sodium chloride 75 mL/hr at 09/28/22 1334     PRN Meds:sodium chloride flush, sodium chloride, acetaminophen, ALPRAZolam, ondansetron **OR** ondansetron, polyethylene glycol, [DISCONTINUED] acetaminophen **OR** acetaminophen    I/O last 3 completed shifts: In: 760 [P.O.:760]  Out: 725 [Urine:725]  No intake/output data recorded. Objective:      Physical Exam:   BP (!) 136/97   Pulse 68   Temp 97.6 °F (36.4 °C) (Temporal)   Resp 18   Ht 5' 11\" (1.803 m)   Wt 231 lb 9.6 oz (105.1 kg)   SpO2 97%   BMI 32.30 kg/m²   CONSTITUTIONAL:  awake, alert, cooperative, no apparent distress, and appears stated age  HEAD:  normocepalic, without obvious abnormality, atraumatic  NECK:  Supple, symmetrical, trachea midline, no adenopathy, thyroid symmetric, not enlarged and no tenderness, skin normal  LUNGS:  No increased work of breathing, No accessory muscle use or intercostal retractions, good air exchange, clear to auscultation bilaterally, no crackles or wheezing  CARDIOVASCULAR:  Normal apical impulse, regular rate and rhythm, normal S1 and S2, no S3 or S4, and no murmur noted, no edema, no JVD, no carotid bruit.   ABDOMEN:  Soft, nontender, no masses, no hepatomegaly, no splenomegaly, BS+  MUSCULOSKELETAL:  No clubbing no cyanosis. there is no redness, warmth, or swelling of the joints  full range of motion noted, the right wrist area looks okay, no swelling no hematoma, pulses +2  NEUROLOGIC:  Alert, awake,oriented x3  SKIN:  no bruising or bleeding, normal skin color, texture, turgor and no redness, warmth, or swelling    Cardiographics  I personally reviewed the telemetry monitor strips with the following interpretation: Sinus rhythm    Echocardiogram: 9/26/2022,Summary   No previous echo for comparison. Technically adequate study. Micro-bubble contrast injected to enhance left ventricular visualization. Left ventricle size is normal.   Mild asymmetric septal hypertrophy. Ejection fraction is visually estimated at 55%. Apical hypokinesis   E/A flow reversal noted. Suggestive of diastolic dysfunction. Imaging  CTA CHEST W CONTRAST   Final Result   1. Limited CT pulmonary angiogram due to poor arterial enhancement. No   central pulmonary embolism is seen. Smaller peripheral emboli cannot be   excluded. 2. Minimal dependent atelectasis or scarring in the lungs. No evidence of   pneumonia. 3. Enlarged right paratracheal and infra carinal lymph nodes of unclear   etiology. These lymph nodes are grossly stable in size as of this CT a of   the chest from 01/17/2021. However, on the previous study, there was   evidence of active pneumonia, which is not currently visualized. Clinical   correlation and follow-up CT of the chest is recommended in 3-6 months.       RECOMMENDATIONS:   Unavailable             Lab Review   Lab Results   Component Value Date/Time     09/28/2022 06:16 AM    K 4.4 09/28/2022 06:16 AM    K 4.4 01/18/2021 08:47 AM     09/28/2022 06:16 AM    CO2 20 09/28/2022 06:16 AM    BUN 9 09/28/2022 06:16 AM    CREATININE 0.7 09/28/2022 06:16 AM    GLUCOSE 121 09/28/2022 06:16 AM    GLUCOSE 90 03/10/2012 01:19 PM    CALCIUM 9.6 09/28/2022 06:16 AM Lab Results   Component Value Date/Time    WBC 9.7 09/28/2022 06:16 AM    HGB 16.3 09/28/2022 06:16 AM    HCT 47.0 09/28/2022 06:16 AM    MCV 93.6 09/28/2022 06:16 AM     09/28/2022 06:16 AM     I have personally reviewed the laboratory, cardiac diagnostic and radiographic testing as outlined above:    Assessment:     1. Acute lateral ST elevation MI: S/p PCI to diagonal branch and left circumflex artery, s/p PCI to RCA today, doing fine  2. CAD: Involving the right coronary artery, for staged PCI to RCA tomorrow  3. Hyperlipidemia: On statin  4. Family history of early CAD    Recommendations:     1. Continue current treatment  2. Increase ambulation as tolerated  3. Basic metabolic panel and CBC in a.m.  4.  Further cardiac recommendations will be forthcoming pending his clinical course and diagnostic test findings    Discussed with patient and his wife at bedside  Electronically signed by Dora Porter MD on 9/28/2022 at 3:47 PM  NOTE: This report was transcribed using voice recognition software.  Every effort was made to ensure accuracy; however, inadvertent computerized transcription errors may be present   Late entry note

## 2022-09-28 NOTE — PROGRESS NOTES
Patient is seen in follow-up for ST elevation MI    Subjective:     Mr. Savita Jensen feels okay today, denies any chest pain or shortness of breath  Laying in bed no apparent distress    ROS:  CONSTITUTIONAL:  negative for  fevers, chills  HEENT:  negative for earaches, nasal congestion and epistaxis  RESPIRATORY:  negative for  dry cough, cough with sputum,wheezing and hemoptysis  GASTROINTESTINAL:  negative for nausea, vomiting  MUSCULOSKELETAL:  negative for  myalgias, arthralgias  NEUROLOGICAL:  negative for visual disturbance, dysphagia    Medication side effects: none    Scheduled Meds:   sodium chloride flush  5-40 mL IntraVENous 2 times per day    aspirin  81 mg Oral Daily    ticagrelor  90 mg Oral BID    atorvastatin  80 mg Oral Nightly     Continuous Infusions:   sodium chloride      sodium chloride 75 mL/hr at 09/28/22 1334     PRN Meds:sodium chloride flush, sodium chloride, acetaminophen, ALPRAZolam, ondansetron **OR** ondansetron, polyethylene glycol, [DISCONTINUED] acetaminophen **OR** acetaminophen    I/O last 3 completed shifts: In: 760 [P.O.:760]  Out: 725 [Urine:725]  No intake/output data recorded. Objective:      Physical Exam:   BP (!) 136/97   Pulse 68   Temp 97.6 °F (36.4 °C) (Temporal)   Resp 18   Ht 5' 11\" (1.803 m)   Wt 231 lb 9.6 oz (105.1 kg)   SpO2 97%   BMI 32.30 kg/m²   CONSTITUTIONAL:  awake, alert, cooperative, no apparent distress, and appears stated age  HEAD:  normocepalic, without obvious abnormality, atraumatic  NECK:  Supple, symmetrical, trachea midline, no adenopathy, thyroid symmetric, not enlarged and no tenderness, skin normal  LUNGS:  No increased work of breathing, No accessory muscle use or intercostal retractions, good air exchange, clear to auscultation bilaterally, no crackles or wheezing  CARDIOVASCULAR:  Normal apical impulse, regular rate and rhythm, normal S1 and S2, no S3 or S4, and no murmur noted, no edema, no JVD, no carotid bruit.   ABDOMEN:  Soft, nontender, no masses, no hepatomegaly, no splenomegaly, BS+  MUSCULOSKELETAL:  No clubbing no cyanosis. there is no redness, warmth, or swelling of the joints  full range of motion noted, the right wrist area looks okay, no swelling no hematoma, pulses +2  NEUROLOGIC:  Alert, awake,oriented x3  SKIN:  no bruising or bleeding, normal skin color, texture, turgor and no redness, warmth, or swelling      Cardiographics  I personally reviewed the telemetry monitor strips with the following interpretation: Sinus rhythm    Echocardiogram:     Imaging  CTA CHEST W CONTRAST   Final Result   1. Limited CT pulmonary angiogram due to poor arterial enhancement. No   central pulmonary embolism is seen. Smaller peripheral emboli cannot be   excluded. 2. Minimal dependent atelectasis or scarring in the lungs. No evidence of   pneumonia. 3. Enlarged right paratracheal and infra carinal lymph nodes of unclear   etiology. These lymph nodes are grossly stable in size as of this CT a of   the chest from 01/17/2021. However, on the previous study, there was   evidence of active pneumonia, which is not currently visualized. Clinical   correlation and follow-up CT of the chest is recommended in 3-6 months.       RECOMMENDATIONS:   Unavailable             Lab Review   Lab Results   Component Value Date/Time     09/28/2022 06:16 AM    K 4.4 09/28/2022 06:16 AM    K 4.4 01/18/2021 08:47 AM     09/28/2022 06:16 AM    CO2 20 09/28/2022 06:16 AM    BUN 9 09/28/2022 06:16 AM    CREATININE 0.7 09/28/2022 06:16 AM    GLUCOSE 121 09/28/2022 06:16 AM    GLUCOSE 90 03/10/2012 01:19 PM    CALCIUM 9.6 09/28/2022 06:16 AM     Lab Results   Component Value Date/Time    WBC 9.7 09/28/2022 06:16 AM    HGB 16.3 09/28/2022 06:16 AM    HCT 47.0 09/28/2022 06:16 AM    MCV 93.6 09/28/2022 06:16 AM     09/28/2022 06:16 AM     I have personally reviewed the laboratory, cardiac diagnostic and radiographic testing as outlined above:    Assessment:     1. Acute lateral ST elevation MI: S/p PCI to diagonal branch and left circumflex artery today, doing fine  2. CAD: Involving the right coronary artery, for staged PCI to RCA tomorrow  3. Hyperlipidemia: On statin  4. Family history of early CAD    Recommendations:     1. Continue current treatment  2. For staged PCI to RCA tomorrow  3. Basic metabolic panel and CBC in a.m.  4.  Further cardiac recommendations will be forthcoming pending his clinical course and diagnostic test findings    Discussed with patient and his wife at bedside  Electronically signed by Cortez Noonan MD on 9/28/2022 at 3:43 PM  NOTE: This report was transcribed using voice recognition software.  Every effort was made to ensure accuracy; however, inadvertent computerized transcription errors may be present   Late entry note

## 2022-09-28 NOTE — CARE COORDINATION
9/28/22  Transition of Care update. Attempted to speak with patient who was unavailable but spoke with patients wife. Patient was admitted for STEMI and had 3 cardiac caths done per wife. Wife states  was independent, driving and working prior to admit. Patient does not use any DME. PCP is Dr. Alberto Hernández and pharmacy is [facility] in [place]. Discharge goal is home with wife with no needs once medically stable. SW/CM to follow.     Electronically signed by ANAMARIA Moreno on 9/28/2022 at 2:56 PM

## 2022-09-28 NOTE — PROCEDURES
[address]                  [address] [id number],  [facility]                            CARDIAC CATHETERIZATION    PATIENT NAME: Tessy Barry                       :        1965  MED REC NO:   [id number]                            ROOM:       8156  ACCOUNT NO:   [de-identified]                           ADMIT DATE: 2022  PROVIDER:     Terence Cisneros MD    DATE OF PROCEDURE:  2022    PROCEDURES:  1. Percutaneous coronary intervention with a deployment of 4.0 x 38 mm  [place] Resolute drug-eluting stent in the mid RCA. 3.  Conscious sedation using Versed and fentanyl. Indication #16, score of 8. INDICATION FOR PROCEDURE:  The patient is a 77-year-old male who  presented to the hospital with ST elevation MI, had PCI to diagonal  branch, PCI to left circumflex artery and he had a very significant RCA  disease. The patient was brought today for stage PCI to RCA. DESCRIPTION OF THE PROCEDURE:  After the appropriate informed consent,  the right wrist area was prepped and draped in the usual sterile  fashion. A timeout was completed. The right wrist area was locally  anesthetized with 2 mL of 2% lidocaine. A 21-gauge needle was used to  access the right radial artery. A 6-Tamazight introducer sheath was used  to cannulate the right radial artery. A 6-Tamazight JR4 guiding catheter  was used to engage the right coronary artery. A BMW wire was used to  cross the lesion. Attempts to wire the acute marginal branch to protect  during the PCI were unsuccessful, so that was left alone for now. Then,  the lesion was predilated using 2.5 x 15 mm balloon. Then, we used a  3.0 x 15 noncompliant balloon. Since we had difficulty advancing the  balloon through the mid to distal segment, we used a GuideLiner for  support trying to advance the stent unsuccessfully. So, the lesion was  predilated again using 3.0 x 15 mm balloon.   Then, we were able to  advance the stent into the area of interest.  The stent was deployed. Attempts to advance a 4.0 x 15 mm noncompliant balloon were unsuccessful  despite the use of the support of a GuideLiner. So, the stents were  postdilated using 3.5 x 8 mm noncompliant balloon. Then, we were able  to advance the 4.0 x 15 mm balloon which was used for postdilation and  proximal optimization. Followup angiography showed 0% residual stenosis  and PHILIPP-3 flow distally. There was mild step-up of the distal edge of  the stent that was left alone. At the end of the procedure, the catheter and the wire were pulled out  from the body, the sheath was pulled out from the body, and Vasc Band  was applied to the right wrist area with effective hemostasis. COMPLICATIONS:  None. ESTIMATED TOTAL BLOOD LOSS:  30 - 40 mL. MEDICATIONS USED DURING THE PROCEDURE:  We used intraarterial verapamil  to prevent vasospasm. We used intraarterial heparin for  anticoagulation. We used intravenous heparin for anticoagulation for  the interventional procedure. Of note, the patient is on aspirin and  Brilinta. CONSCIOUS SEDATION:  We used Versed and fentanyl for conscious sedation. First dose was given at 1141, procedure ended at 1253. There was 72  minutes of direct face-to-face supervision during conscious sedation  administration. TOTAL CONTRAST USED:  140 mL of Isovue. TOTAL FLUOROSCOPY TIME:  18.7 minutes. IMPRESSION:  Percutaneous coronary intervention with deployment of 4.0 x  38 mm [place] Resolute drug-eluting stent in the mid RCA, (pre-PCI 90%  stenosis, post-PCI 0% stenosis, pre PCI PHILIPP-3 flow, post-PCI PHILIPP-3  flow). RECOMMENDATIONS:  1. Aspirin for life. 2.  P2Y12 inhibitors for at least a year, preferably longer. 3.  Aggressive coronary artery disease risk factor modifications. 4.  The patient will be observed overnight and discharge home tomorrow  if he meets discharge criteria.         Margi Daniels MD    D: 09/28/2022 13:12:16       T: 09/28/2022 14:20:19     [email or web address]  Job#: [id number]     Doc#: [id number]    CC:

## 2022-09-28 NOTE — CARE COORDINATION
9/28/22 Update CM Note; patient to the cath lab for staged PCI. Will follow for any home going needs.  Electronically signed by Anne Lozoya RN CM on 9/28/2022 at 2:52 PM

## 2022-09-28 NOTE — PATIENT CARE CONFERENCE
P Quality Flow/Interdisciplinary Rounds Progress Note        Quality Flow Rounds held on September 28, 2022    Disciplines Attending:  Bedside Nurse, , , and Nursing Unit Leadership    Darryl Romo was admitted on 9/26/2022  8:46 AM    Anticipated Discharge Date:       Disposition:    Dao Score:  Dao Scale Score: 23    Readmission Risk              Risk of Unplanned Readmission:  7           Discussed patient goal for the day, patient clinical progression, and barriers to discharge.   The following Goal(s) of the Day/Commitment(s) have been identified:  Diagnostics - Report Results      Bebeto Osorio RN  September 28, 2022

## 2022-09-28 NOTE — PROGRESS NOTES
Hospitalist Progress Note      SYNOPSIS: Patient admitted on 2022 for STEMI (ST elevation myocardial infarction) ([facility] [address] He was admitted with a complaint of severe chest pain, with associated numbness in his chest and both arms. He was brought into the ED and EKG showed ST segment elevation in the far lateral leads. He was admitted and managed for ST elevation MI. He had cardiac cath on 2022 but he could not have PCI. He therefore had repeat cardiac cath on 2022 with PCI and DANIEL to the first diagonal branch. SUBJECTIVE:    Patient seen and examined. He had no complaints and had an uneventful night. Wife was by his bedside. He said he had some mild chest discomfort from where the telemetry leads were attached, but denied it was pressure like and similar to the cardiac chest pain he had. Review of systems is otherwise negative. He is to have repeat cardiac cath again today as he said he had 2 stents placed and was told he needed a third one done. Records reviewed. Temp (24hrs), Av.8 °F (36.6 °C), Min:96.9 °F (36.1 °C), Max:98.4 °F (36.9 °C)    DIET: Diet NPO Exceptions are: Sips of Water with Meds  CODE: Full Code    Intake/Output Summary (Last 24 hours) at 2022 1024  Last data filed at 2022 0729  Gross per 24 hour   Intake 480 ml   Output --   Net 480 ml       OBJECTIVE:    /86   Pulse 75   Temp 97.6 °F (36.4 °C)   Resp 16   Ht 5' 11\" (1.803 m)   Wt 231 lb 9.6 oz (105.1 kg)   SpO2 96%   BMI 32.30 kg/m²     General appearance: No apparent distress, appears stated age and cooperative. HEENT:  Conjunctivae/corneas clear. Neck: Supple. No jugular venous distention. Respiratory: Clear to auscultation bilaterally, normal respiratory effort  Cardiovascular: Regular rate rhythm, normal S1-S2  Abdomen: Soft, nontender, nondistended  Musculoskeletal: No clubbing, cyanosis, no bilateral lower extremity edema. Brisk capillary refill.    Skin:  No rashes  on visible skin  Neurologic: awake, alert and following commands     ASSESSMENT:    #STEMI  -s/p cardiac cath. Had repeat cardiac cath with DANIEL to first diagonal branch and to first obtuse marginal branch  -on aspirin and statin as well as brilinta and metoprolol  -2D echo showed EF of 55% and apical hypokinesis and mild septal hypertrophy, suggestive of diastolic dysfunction  -for a repeat cardiac cath again today for steged PCI to the RCA     #Hyperlipidemia: on high intensity statin     #Impaired glucose tolerance  -A1C from July 23rd was 6.  -ISS. Counseled on healthy DASH diet. DVT prophylaxis:SCDs      DISPOSITION: to be determined    Medications:  REVIEWED DAILY    Infusion Medications    sodium chloride       Scheduled Medications    sodium chloride flush  5-40 mL IntraVENous 2 times per day    aspirin  81 mg Oral Daily    ticagrelor  90 mg Oral BID    atorvastatin  80 mg Oral Nightly     PRN Meds: sodium chloride flush, sodium chloride, morphine, ondansetron **OR** ondansetron, polyethylene glycol, acetaminophen **OR** acetaminophen    Labs:     Recent Labs     09/26/22  0555 09/27/22  0459 09/28/22  0616   WBC 5.4 10.0 9.7   HGB 15.8 16.8* 16.3   HCT 45.9 48.8 47.0    190 191       Recent Labs     09/26/22  0555 09/27/22  0459 09/28/22  0616    137 139   K 4.1 4.4 4.4    103 103   CO2 23 21* 20*   BUN 12 7 9   CREATININE 0.8 0.7 0.7   CALCIUM 9.3 9.4 9.6       Recent Labs     09/27/22  0459   PROT 7.4   ALKPHOS 72   ALT 49*   AST 95*   BILITOT 0.7       No results for input(s): INR in the last 72 hours. No results for input(s): Kay Wadsworth in the last 72 hours.     Chronic labs:    Lab Results   Component Value Date    CHOL 150 08/16/2021    TRIG 120 08/16/2021    HDL 32 07/23/2022    LDLCALC 96 07/23/2022    TSH 0.991 07/23/2022    PSA 1.13 07/23/2022    INR 1.1 01/19/2021    LABA1C 6.0 (H) 07/23/2022       Radiology: REVIEWED DAILY    +++++++++++++++++++++++++++++++++++++++++++++++++  Nury Iverson MD  [facility] - [address] [place] [address] [place], [facility]  +++++++++++++++++++++++++++++++++++++++++++++++++  NOTE: This report was transcribed using voice recognition software. Every effort was made to ensure accuracy; however, inadvertent computerized transcription errors may be present.

## 2022-09-29 VITALS
HEART RATE: 93 BPM | RESPIRATION RATE: 26 BRPM | HEIGHT: 71 IN | OXYGEN SATURATION: 96 % | WEIGHT: 231.6 LBS | TEMPERATURE: 98.5 F | SYSTOLIC BLOOD PRESSURE: 129 MMHG | DIASTOLIC BLOOD PRESSURE: 82 MMHG | BODY MASS INDEX: 32.42 KG/M2

## 2022-09-29 LAB
ANION GAP SERPL CALCULATED.3IONS-SCNC: 13 MMOL/L (ref 7–16)
BUN BLDV-MCNC: 10 MG/DL (ref 6–20)
CALCIUM SERPL-MCNC: 9.4 MG/DL (ref 8.6–10.2)
CHLORIDE BLD-SCNC: 103 MMOL/L (ref 98–107)
CO2: 20 MMOL/L (ref 22–29)
CREAT SERPL-MCNC: 0.7 MG/DL (ref 0.7–1.2)
GFR AFRICAN AMERICAN: >60
GFR NON-AFRICAN AMERICAN: >60 ML/MIN/1.73
GLUCOSE BLD-MCNC: 115 MG/DL (ref 74–99)
HCT VFR BLD CALC: 43.2 % (ref 37–54)
HEMOGLOBIN: 14.5 G/DL (ref 12.5–16.5)
MCH RBC QN AUTO: 31.2 PG (ref 26–35)
MCHC RBC AUTO-ENTMCNC: 33.6 % (ref 32–34.5)
MCV RBC AUTO: 92.9 FL (ref 80–99.9)
PDW BLD-RTO: 13.3 FL (ref 11.5–15)
PLATELET # BLD: 186 E9/L (ref 130–450)
PMV BLD AUTO: 10.2 FL (ref 7–12)
POTASSIUM SERPL-SCNC: 4.2 MMOL/L (ref 3.5–5)
RBC # BLD: 4.65 E12/L (ref 3.8–5.8)
SODIUM BLD-SCNC: 136 MMOL/L (ref 132–146)
WBC # BLD: 9 E9/L (ref 4.5–11.5)

## 2022-09-29 PROCEDURE — 85027 COMPLETE CBC AUTOMATED: CPT

## 2022-09-29 PROCEDURE — 36415 COLL VENOUS BLD VENIPUNCTURE: CPT

## 2022-09-29 PROCEDURE — 99232 SBSQ HOSP IP/OBS MODERATE 35: CPT | Performed by: INTERNAL MEDICINE

## 2022-09-29 PROCEDURE — 80048 BASIC METABOLIC PNL TOTAL CA: CPT

## 2022-09-29 PROCEDURE — 6370000000 HC RX 637 (ALT 250 FOR IP): Performed by: INTERNAL MEDICINE

## 2022-09-29 PROCEDURE — 2580000003 HC RX 258: Performed by: INTERNAL MEDICINE

## 2022-09-29 RX ORDER — ASPIRIN 81 MG/1
81 TABLET ORAL DAILY
Qty: 30 TABLET | Refills: 3 | Status: SHIPPED | OUTPATIENT
Start: 2022-09-30

## 2022-09-29 RX ORDER — ATORVASTATIN CALCIUM 80 MG/1
80 TABLET, FILM COATED ORAL NIGHTLY
Qty: 30 TABLET | Refills: 3 | Status: SHIPPED | OUTPATIENT
Start: 2022-09-29

## 2022-09-29 RX ADMIN — TICAGRELOR 90 MG: 90 TABLET ORAL at 08:36

## 2022-09-29 RX ADMIN — SODIUM CHLORIDE, PRESERVATIVE FREE 10 ML: 5 INJECTION INTRAVENOUS at 08:37

## 2022-09-29 RX ADMIN — ASPIRIN 81 MG: 81 TABLET, COATED ORAL at 08:36

## 2022-09-29 ASSESSMENT — PAIN SCALES - GENERAL: PAINLEVEL_OUTOF10: 0

## 2022-09-29 NOTE — PROGRESS NOTES
CLINICAL PHARMACY NOTE: MEDS TO BEDS    Total # of Prescriptions Filled: 3   The following medications were delivered to the patient:  Second delivery  Atorvastatin calcium 80 mg  Aspirin 81 mg  Brilinta 90 mg  Delivered to pt @ 2:30p    Additional Documentation:

## 2022-09-29 NOTE — PROGRESS NOTES
Patient is seen in follow-up for ST elevation MI    Subjective:     Mr. Antonio Rascon feels okay today. Laying in bed no apparent distress    ROS:  CONSTITUTIONAL:  negative for  fevers, chills  HEENT:  negative for earaches, nasal congestion and epistaxis  RESPIRATORY:  negative for  dry cough, cough with sputum,wheezing and hemoptysis  GASTROINTESTINAL:  negative for nausea, vomiting  MUSCULOSKELETAL:  negative for  myalgias, arthralgias  NEUROLOGICAL:  negative for visual disturbance, dysphagia    Medication side effects: none    Scheduled Meds:   sodium chloride flush  5-40 mL IntraVENous 2 times per day    aspirin  81 mg Oral Daily    ticagrelor  90 mg Oral BID    atorvastatin  80 mg Oral Nightly     Continuous Infusions:   sodium chloride       PRN Meds:sodium chloride flush, sodium chloride, acetaminophen, ALPRAZolam, ondansetron **OR** ondansetron, polyethylene glycol, [DISCONTINUED] acetaminophen **OR** acetaminophen    I/O last 3 completed shifts: In: 880.3 [P.O.:480; I.V.:400.3]  Out: 950 [Urine:950]  I/O this shift: In: 5 [P.O.:420]  Out: -       Objective:      Physical Exam:   /82   Pulse 93   Temp 98.5 °F (36.9 °C) (Oral)   Resp 26   Ht 5' 11\" (1.803 m)   Wt 231 lb 9.6 oz (105.1 kg)   SpO2 96%   BMI 32.30 kg/m²   CONSTITUTIONAL:  awake, alert, cooperative, no apparent distress, and appears stated age  HEAD:  normocepalic, without obvious abnormality, atraumatic  NECK:  Supple, symmetrical, trachea midline, no adenopathy, thyroid symmetric, not enlarged and no tenderness, skin normal  LUNGS:  No increased work of breathing, No accessory muscle use or intercostal retractions, good air exchange, clear to auscultation bilaterally, no crackles or wheezing  CARDIOVASCULAR:  Normal apical impulse, regular rate and rhythm, normal S1 and S2, no S3 or S4, and no murmur noted, no edema, no JVD, no carotid bruit.   ABDOMEN:  Soft, nontender, no masses, no hepatomegaly, no splenomegaly, BS+  MUSCULOSKELETAL:  No clubbing no cyanosis. there is no redness, warmth, or swelling of the joints  full range of motion noted, the right wrist area looks okay, no swelling no hematoma, pulses +2  NEUROLOGIC:  Alert, awake,oriented x3  SKIN:  no bruising or bleeding, normal skin color, texture, turgor and no redness, warmth, or swelling    Cardiographics  I personally reviewed the telemetry monitor strips with the following interpretation: Sinus rhythm    Echocardiogram: 9/26/2022,Summary   No previous echo for comparison. Technically adequate study. Micro-bubble contrast injected to enhance left ventricular visualization. Left ventricle size is normal.   Mild asymmetric septal hypertrophy. Ejection fraction is visually estimated at 55%. Apical hypokinesis   E/A flow reversal noted. Suggestive of diastolic dysfunction. Imaging  CTA CHEST W CONTRAST   Final Result   1. Limited CT pulmonary angiogram due to poor arterial enhancement. No   central pulmonary embolism is seen. Smaller peripheral emboli cannot be   excluded. 2. Minimal dependent atelectasis or scarring in the lungs. No evidence of   pneumonia. 3. Enlarged right paratracheal and infra carinal lymph nodes of unclear   etiology. These lymph nodes are grossly stable in size as of this CT a of   the chest from 01/17/2021. However, on the previous study, there was   evidence of active pneumonia, which is not currently visualized. Clinical   correlation and follow-up CT of the chest is recommended in 3-6 months.       RECOMMENDATIONS:   Unavailable             Lab Review   Lab Results   Component Value Date/Time     09/29/2022 04:12 AM    K 4.2 09/29/2022 04:12 AM    K 4.4 01/18/2021 08:47 AM     09/29/2022 04:12 AM    CO2 20 09/29/2022 04:12 AM    BUN 10 09/29/2022 04:12 AM    CREATININE 0.7 09/29/2022 04:12 AM    GLUCOSE 115 09/29/2022 04:12 AM    GLUCOSE 90 03/10/2012 01:19 PM    CALCIUM 9.4 09/29/2022 04:12 AM     Lab Results   Component Value Date/Time    WBC 9.0 09/29/2022 04:12 AM    HGB 14.5 09/29/2022 04:12 AM    HCT 43.2 09/29/2022 04:12 AM    MCV 92.9 09/29/2022 04:12 AM     09/29/2022 04:12 AM     I have personally reviewed the laboratory, cardiac diagnostic and radiographic testing as outlined above:    Assessment:     1. Acute lateral ST elevation MI: S/p PCI to diagonal branch and left circumflex artery, s/p PCI to RCA, doing fine  2. Hyperlipidemia: On statin  3. Family history of early CAD    Recommendations:     1. Continue current treatment  2. Increase ambulation as tolerated  3. Risk of instent thrombosis due to premature discontinuation of either ASA or Brilinta discussed with patient at length    Can be discharged from cardiology standpoint    Discussed with patient and his wife at bedside  Electronically signed by Elia Pires MD on 9/29/2022 at 2:05 PM  NOTE: This report was transcribed using voice recognition software.  Every effort was made to ensure accuracy; however, inadvertent computerized transcription errors may be present   Late entry note

## 2022-09-29 NOTE — DISCHARGE SUMMARY
Pulmonary effort is normal. No respiratory distress. Breath sounds: Normal breath sounds. Abdominal:      General: Bowel sounds are normal. There is no distension. Palpations: Abdomen is soft. Tenderness: There is no abdominal tenderness. Musculoskeletal:         General: No swelling. Cervical back: Neck supple. Skin:     Findings: No lesion or rash. Neurological:      General: No focal deficit present. Mental Status: He is alert and oriented to person, place, and time. Mental status is at baseline. Cranial Nerves: No cranial nerve deficit. Pertinent Studies During Hospital Stay:  Radiology:  Echo Complete    Result Date: 9/27/2022  Transthoracic Echocardiography Report (TTE)  Demographics   Patient Name         Alexus Benoit Gender                Male   Medical Record       [id number]     Room Number           3118  Number   Account #            [de-identified]    Procedure Date        09/26/2022   Corporate ID                      Ordering Physician    Devon Motta MD   Accession Number     [id number]   Referring Physician   Jose Luis Nicolas DO   Date of Birth        1965   Sonographer           Abbey HENDERSON   Age                  64 year(s)   Interpreting          Devon Motta MD                                    Physician                                     Any Other  Procedure Type of Study   TTE procedure:Echocardiogram W/Contrast.  Procedure Date Date: 09/26/2022 Start: 03:22 PM Study Location: Portable Technical Quality: Limited visualization due to body habitus. Indications:Myocardial infarction. Patient Status: Routine Contrast Medium: Definity. Amount - 2 ml Contrast Comments: given by the rn . Height: 71 inches Weight: 231 pounds BSA: 2.24 m^2 BMI: 32.22 kg/m^2 Rhythm: Within normal limits HR: 56 bpm BP: 141/94 mmHg  Findings   Left Ventricle  Micro-bubble contrast injected to enhance left ventricular visualization.   Left ventricle size is normal.  Mild asymmetric septal hypertrophy. Ejection fraction is visually estimated at 55%. Apical hypokinesis  E/A flow reversal noted. Suggestive of diastolic dysfunction. No evidence of left ventricular mass or thrombus noted. Right Ventricle  Normal right ventricular size and function. Left Atrium  Left atrium is of normal size. Interatrial septum appears intact. Right Atrium  Normal right atrium size. Mitral Valve  Structurally normal mitral valve. No evidence of mitral valve stenosis. No evidence of mitral regurgitaton. Tricuspid Valve  The tricuspid valve appears structurally normal.  No evidence of tricuspid regurgitation. Aortic Valve  Aortic valve opens well. The aortic valve appears mildly sclerotic. No  evidence of aortic valve regurgitation. No hemodynamically significant  aortic stenosis is present. Pulmonic Valve  Physiologic and/or trace pulmonic regurgitation present. The pulmonic valve was not well visualized. Pericardial Effusion  No evidence of pericardial effusion. Aorta  Borderline dilated aortic root. Conclusions   Summary  No previous echo for comparison. Technically adequate study. Micro-bubble contrast injected to enhance left ventricular visualization. Left ventricle size is normal.  Mild asymmetric septal hypertrophy. Ejection fraction is visually estimated at 55%. Apical hypokinesis  E/A flow reversal noted. Suggestive of diastolic dysfunction.    Signature   ----------------------------------------------------------------  Electronically signed by Terence Cisneros MD(Interpreting  physician) on 09/27/2022 08:54 AM  ----------------------------------------------------------------  M-Mode/2D Measurements & Calculations   LV Diastolic    LV Systolic Dimension: 3.4   AV Cusp Separation: 2.2 cmLA  Dimension: 4.3  cm                           Dimension: 2.8 cmAO Root  cm              LV Volume Diastolic: 32.5 ml Dimension: 4.2 cm  LV FS:20.9 %    LV Volume Systolic: 55.0 ml  LV PW           LV EDV/LV EDV Index: 70.8  Diastolic: 1.1  FB/53 NH/H^1FL ESV/LV ESV  cm              Index: 47.4 ml/21ml/ m^2     RV Diastolic Dimension: 3 cm  LV PW Systolic: EF Calculated: 11.0 %  1.5 cm          LV Mass Index: 82 l/min*m^2  Ascending Aorta: 3.9 cm  Septum          LV Length: 8.9 cm            LA volume/Index: 89.3 ml  Diastolic: 1.3                               /27.04ml/m^2  cm              LVOT: 2 cm                   RA Area: 15.4 cm^2  Septum  Systolic: 1.4  cm  CO: 4.31 l/min  CI: 1.38  l/m*m^2  LV Mass: 184.69  g  Doppler Measurements & Calculations   MV Peak E-Wave:    AV Peak Velocity: 1.17 m/s      LVOT Peak Velocity:  0.67 m/s           AV Peak Gradient: 5.5 mmHg      0.85 m/s  MV Peak A-Wave:    AV Mean Velocity: 0.8 m/s       LVOT Mean Velocity:  0.68 m/s           AV Mean Gradient: 2.8 mmHg      0.59 m/s  MV E/A Ratio: 0.98 AV VTI: 23.2 cm                 LVOT Peak Gradient: 2.9  MV Peak Gradient:  AV Area (Continuity):2.37 cm^2  mmHgLVOT Mean Gradient:  2.7 mmHg                                           1.5 mmHg  MV Mean Gradient:  LVOT VTI: 17.5 cm  0.9 mmHg  MV Mean Velocity:  0.43 m/s           Pulm. Vein A Reversal  MV Deceleration    Duration:103.8 msec  Time: 233 msec     Pulm. Vein D Velocity:0.45  MV P1/2t: 92.5     m/sPulm. Vein A Reversal  msec               Velocity:0.27 m/s  MVA by PHT:2.38    Pulm. Vein S Velocity: 0.55 m/s  cm^2  MV Area  (continuity): 2.5  cm^2  MV E' Septal  Velocity: 0.06 m/s  MV E' Lateral  Velocity: 9 m/s  [email or web address] [id number]    XR CHEST PORTABLE    Result Date: 9/26/2022  EXAMINATION: ONE XRAY VIEW OF THE CHEST 9/26/2022 6:15 am COMPARISON: Correlation to chest CT examination of 01/17/2021.  HISTORY: ORDERING SYSTEM PROVIDED HISTORY: chest pain TECHNOLOGIST PROVIDED HISTORY: Reason for exam:->chest pain FINDINGS: Normal cardiomediastinal silhouette. Diminished lung volumes with no focal infiltrate detected radiographically. No pneumothorax or effusion. Osseous thorax intact. No acute disease. RECOMMENDATION: Careful clinical correlation and follow up recommended. CTA CHEST W CONTRAST    Result Date: 9/26/2022  EXAMINATION: CTA OF THE CHEST 9/26/2022 1:07 pm TECHNIQUE: CTA of the chest was performed after the administration of intravenous contrast.  Multiplanar reformatted images are provided for review. MIP images are provided for review. Automated exposure control, iterative reconstruction, and/or weight based adjustment of the mA/kV was utilized to reduce the radiation dose to as low as reasonably achievable. COMPARISON: CTA of the chest, 01/17/2021. HISTORY: ORDERING SYSTEM PROVIDED HISTORY: rule out dissection TECHNOLOGIST PROVIDED HISTORY: Reason for exam:->rule out dissection What reading provider will be dictating this exam?->CRC FINDINGS: Pulmonary Arteries: The pulmonary arterial enhancement is suboptimal.  Within this limitation, no central pulmonary embolism is seen. Small peripheral emboli within the segmental and subsegmental arteries cannot be excluded on this study. Probable pulmonary arterial hypertension, with dilation of the main pulmonary artery (normal caliber less than 3 cm) to 3.1 cm. Mediastinum: The heart is normal in size. Moderate calcified coronary atherosclerosis is seen. Minimal atherosclerosis is seen in the aorta. No aneurysm. Enlarged mediastinal lymph nodes are noted. A 1.9 x 1.7 cm right paratracheal lymph node is seen (series 4, image 22). Another 3.8 x 1.8 cm infra carinal lymph node is noted (image 63). Lungs/pleura: Mild subpleural atelectasis and/or scarring is seen in the dependent aspects of the lungs bilaterally. No evidence of pneumonia. No pulmonary nodule or mass is seen. The central airway is clear. No pneumothorax or pleural effusion is seen.  Upper Abdomen: 4 mm intrarenal calculus is seen in the left kidney. The partially visualized kidneys are otherwise grossly unremarkable. Soft Tissues/Bones: The visualized bones are intact without fracture or focal lesion. 1. Limited CT pulmonary angiogram due to poor arterial enhancement. No central pulmonary embolism is seen. Smaller peripheral emboli cannot be excluded. 2. Minimal dependent atelectasis or scarring in the lungs. No evidence of pneumonia. 3. Enlarged right paratracheal and infra carinal lymph nodes of unclear etiology. These lymph nodes are grossly stable in size as of this CT a of the chest from 01/17/2021. However, on the previous study, there was evidence of active pneumonia, which is not currently visualized. Clinical correlation and follow-up CT of the chest is recommended in 3-6 months.  RECOMMENDATIONS: Unavailable         Last Labs on Discharge:   Recent Results (from the past 24 hour(s))   POC ACT-Low Range    Collection Time: 09/28/22 12:38 PM   Result Value Ref Range    POC ACT  Not established seconds   EKG 12 lead    Collection Time: 09/28/22  2:17 PM   Result Value Ref Range    Ventricular Rate 55 BPM    Atrial Rate 55 BPM    P-R Interval 188 ms    QRS Duration 104 ms    Q-T Interval 432 ms    QTc Calculation (Bazett) 413 ms    P Axis 62 degrees    R Axis 46 degrees    T Axis 98 degrees   Basic Metabolic Panel    Collection Time: 09/29/22  4:12 AM   Result Value Ref Range    Sodium 136 132 - 146 mmol/L    Potassium 4.2 3.5 - 5.0 mmol/L    Chloride 103 98 - 107 mmol/L    CO2 20 (L) 22 - 29 mmol/L    Anion Gap 13 7 - 16 mmol/L    Glucose 115 (H) 74 - 99 mg/dL    BUN 10 6 - 20 mg/dL    Creatinine 0.7 0.7 - 1.2 mg/dL    GFR Non-African American >60 >=60 mL/min/1.73    GFR African American >60     Calcium 9.4 8.6 - 10.2 mg/dL   CBC    Collection Time: 09/29/22  4:12 AM   Result Value Ref Range    WBC 9.0 4.5 - 11.5 E9/L    RBC 4.65 3.80 - 5.80 E12/L    Hemoglobin 14.5 12.5 - 16.5 g/dL Hematocrit 43.2 37.0 - 54.0 %    MCV 92.9 80.0 - 99.9 fL    MCH 31.2 26.0 - 35.0 pg    MCHC 33.6 32.0 - 34.5 %    RDW 13.3 11.5 - 15.0 fL    Platelets 742 580 - 437 E9/L    MPV 10.2 7.0 - 12.0 fL         Follow up: with Stalin Ruiz MD  Discharge Instructions / Follow up:  No future appointments. Time Spent on discharge is 45 minutes in the examination, evaluation, counseling and review of medications and discharge plan.    +++++++++++++++++++++++++++++++++++++++++++++++++  Josiane Rodriguez MD  [address] [place] [address]  +++++++++++++++++++++++++++++++++++++++++++++++++    NOTE: This report was transcribed using voice recognition software. Every effort was made to ensure accuracy; however, inadvertent computerized transcription errors may be present.

## 2022-09-29 NOTE — PROGRESS NOTES
CLINICAL PHARMACY NOTE: MEDS TO BEDS    Total # of Prescriptions Filled: 1   The following medications were delivered to the patient:  Brilinta 90 mg    Additional Documentation:

## 2022-09-29 NOTE — PROGRESS NOTES
Discharge instructions provided to patient and . Information regarding medications and follow up appointments given. Brilinta coupon and stent cards given. IVs removed and dressings applied. Monitor removed, cleaned, and returned to nurses station. Importance of taking medications as prescribed discussed at length with verbalized understanding.

## 2022-09-29 NOTE — PATIENT CARE CONFERENCE
P Quality Flow/Interdisciplinary Rounds Progress Note        Quality Flow Rounds held on September 29, 2022    Disciplines Attending:  Bedside Nurse, , , and Nursing Unit Leadership    Dereck Dominique was admitted on 9/26/2022  8:46 AM    Anticipated Discharge Date:       Disposition:    Dao Score:  Dao Scale Score: 23    Readmission Risk              Risk of Unplanned Readmission:  7           Discussed patient goal for the day, patient clinical progression, and barriers to discharge.   The following Goal(s) of the Day/Commitment(s) have been identified:  Diagnostics - Report Results and Labs - Report Results      Josephine Lewis RN  September 29, 2022

## 2022-09-29 NOTE — DISCHARGE INSTRUCTIONS
Cardiac Rehabilitation: Discharge instructions        Cardiac rehabilitation is a program for people who have a heart problem, such as a heart attack, coronary stent placed, heart failure, or a heart valve disease. The program includes exercise, lifestyle changes, education, and emotional support. Cardiac rehab can help you improve the quality of your life through better overall health. It can help you lose weight and feel better about yourself. On your cardiac rehab team, you may have your doctor, a nurse specialist, an exercise physiologist, and a dietitian. They will design your cardiac rehab program specifically for you. You will learn how to reduce your risk for heart problems, how to manage stress, and how to eat a heart-healthy diet. By the end of the program, you will be ready to maintain a healthier lifestyle on your own. Follow-up care is a key part of your treatment and safety. Be sure to make and go to all appointments, and call your doctor if you are having problems. It's also a good idea to know your test results and keep a list of the medicines you take. Please call to schedule your first appointment once you have been cleared by your cardiologist to attend Phase II Outpatient Cardiac Rehabilitation. Cardiac Rehabilitation Options:  [address] [place] [address].                                                                                           Cardiology Services  [place], [address] [facility] [address], [facility]  Hours: M/W/F 7am-7pm & T/Th 7am-12pm                                                  [phone] [address]  Cardiac Rehab  [facility]. [address] [place]  P- [phone]                                                                                         Cardiac Rehabilitation  Hours: M/W/F 7am-6pm                                                                                [place], [address]  [facility]                                                          [phone]  Cardiac Rehabilitation                                                                                   [phone]  [address] [place] [address] Dr, [address]                                                                                        [facility]  [phone]                                                                                          Cardiac Rehabilitation  [phone]                                                                                          [address] [place] [address].  [address]                                                                                                                        [phone]                                                                                                                        [phone]

## 2022-09-29 NOTE — CONSULTS
Met with patient and discussed that their physician has ordered a referral to our outpatient Phase II Cardiac Rehabilitation program. Reviewed the benefits of cardiac rehabilitation based on their diagnosis and personal risk factors. Patient demonstrates moderate interest in Cardiac Rehabilitation at this time. Cardiac Rehabilitation brochure provided to patient/family. The Cardiac Rehabilitation Program has been provided the patient's referral information and pertinent patient details and history. The patient may call [facility] [place] at [phone] for additional information or questions. Contact information for [facility] [employer] and other choices close to the patient's residence have been provided in the discharge instructions so that the patient may call and schedule an appointment when cleared by their physician.  Thank you for the referral.

## 2022-10-03 ENCOUNTER — OFFICE VISIT (OUTPATIENT)
Dept: CARDIOLOGY CLINIC | Age: 57
End: 2022-10-03
Payer: COMMERCIAL

## 2022-10-03 VITALS
SYSTOLIC BLOOD PRESSURE: 132 MMHG | WEIGHT: 232 LBS | HEIGHT: 71 IN | BODY MASS INDEX: 32.48 KG/M2 | RESPIRATION RATE: 16 BRPM | DIASTOLIC BLOOD PRESSURE: 70 MMHG | HEART RATE: 72 BPM

## 2022-10-03 DIAGNOSIS — I25.10 CAD IN NATIVE ARTERY: Primary | ICD-10-CM

## 2022-10-03 PROCEDURE — 99213 OFFICE O/P EST LOW 20 MIN: CPT | Performed by: INTERNAL MEDICINE

## 2022-10-03 PROCEDURE — 93000 ELECTROCARDIOGRAM COMPLETE: CPT | Performed by: INTERNAL MEDICINE

## 2022-10-03 RX ORDER — ROSUVASTATIN CALCIUM 5 MG/1
TABLET, COATED ORAL
COMMUNITY
Start: 2022-09-18

## 2022-10-03 NOTE — PROGRESS NOTES
[facility] Cardiology Progress Note  Dr. Matilde William      Referring Physician: Ericka Vargas MD  CHIEF COMPLAINT:   Chief Complaint   Patient presents with    Coronary Artery Disease     Follow up        HISTORY OF PRESENT ILLNESS:   Patient is 64years old male with history of CAD s/p PCI to diagonal branch, OM branch and RCA, lateral wall MI, is here for follow-up appointment. Patient denies any chest pain, no shortness of breath, no lightheadedness, no dizziness, no palpitations, no pedal edema, no PND, no orthopnea, no syncope, no presyncopal episodes. Occasional headache  Functional capacity is at baseline    Past Medical History:   Diagnosis Date    CAD (coronary artery disease)     Elevated liver enzymes     Hyperlipidemia     Impaired fasting glucose     Obesity (BMI 86.2-68.8)     34    Umbilical hernia          Past Surgical History:   Procedure Laterality Date    CORONARY ANGIOPLASTY WITH STENT PLACEMENT  09/27/2022    Dr. Irwin Good; 2.25x22 D1; 2.25x22 Cx, OM1    CORONARY ANGIOPLASTY WITH STENT PLACEMENT  09/28/2022    Dr Marilia Sheikh- Shekhar Braxton DANIEL 4.0x15 Mid RCA    EYE SURGERY           Current Outpatient Medications   Medication Sig Dispense Refill    rosuvastatin (CRESTOR) 5 MG tablet TAKE ONE TABLET BY MOUTH AT BEDTIME      aspirin 81 MG EC tablet Take 1 tablet by mouth daily 30 tablet 3    ticagrelor (BRILINTA) 90 MG TABS tablet Take 1 tablet by mouth 2 times daily 180 tablet 3    atorvastatin (LIPITOR) 80 MG tablet Take 1 tablet by mouth nightly (Patient not taking: Reported on 10/3/2022) 30 tablet 3     No current facility-administered medications for this visit.          Allergies as of 10/03/2022    (No Known Allergies)       Social History     Socioeconomic History    Marital status:      Spouse name: Not on file    Number of children: Not on file    Years of education: Not on file    Highest education level: Not on file   Occupational History    Not on file   Tobacco Use Smoking status: Never    Smokeless tobacco: Never   Substance and Sexual Activity    Alcohol use: Yes     Alcohol/week: 10.0 standard drinks     Types: 10 Cans of beer per week     Comment: weekly    Drug use: No    Sexual activity: Not on file   Other Topics Concern    Not on file   Social History Narrative    Not on file     Social Determinants of Health     Financial Resource Strain: Not on file   Food Insecurity: Not on file   Transportation Needs: Not on file   Physical Activity: Not on file   Stress: Not on file   Social Connections: Not on file   Intimate Partner Violence: Not on file   Housing Stability: Not on file     Family history of early CAD    REVIEW OF SYSTEMS:   CONSTITUTIONAL:  negative for  fevers, chills, sweats and fatigue  HEENT:  negative for  tinnitus, earaches, nasal congestion and epistaxis  RESPIRATORY:  negative for  dry cough, cough with sputum, wheezing and hemoptysis  GASTROINTESTINAL:  negative for nausea, vomiting, diarrhea, constipation, pruritus and jaundice  HEMATOLOGIC/LYMPHATIC:  negative for easy bruising, bleeding, lymphadenopathy and petechiae  ENDOCRINE:  negative for heat intolerance, cold intolerance, tremor, hair loss and diabetic symptoms including neither polyuria nor polydipsia nor blurred vision  MUSCULOSKELETAL:  negative for  myalgias, arthralgias, joint swelling, stiff joints and decreased range of motion  NEUROLOGICAL:  negative for memory problems, speech problems, visual disturbance, dysphagia, weakness and numbness      PHYSICAL EXAM:   CONSTITUTIONAL:  awake, alert, cooperative, no apparent distress, and appears stated age  HEAD:  normocepalic, without obvious abnormality, atraumatic, pink, moist mucous membranes.   NECK:  Supple, symmetrical, trachea midline, no adenopathy, thyroid symmetric, not enlarged and no tenderness, skin normal  LUNGS:  No increased work of breathing, good air exchange, clear to auscultation bilaterally, no crackles or wheezing  CARDIOVASCULAR:  Normal apical impulse, regular rate and rhythm, normal S1 and S2, no S3 or S4, and no murmur noted and no JVD, no carotid bruit, no pedal edema, good carotid upstroke bilaterally. ABDOMEN:  Soft, nontender, no masses, no hepatomegaly or splenomegaly, BS+  CHEST: nontender to palpation, expands symmetrically  MUSCULOSKELETAL:  No clubbing no cyanosis. there is no redness, warmth, or swelling of the joints  full range of motion noted  NEUROLOGIC:  Alert, awake,oriented x3. SKIN:  no bruising or bleeding, normal skin color, texture, turgor and no redness, warmth, or swelling        /70   Pulse 72   Resp 16   Ht 5' 11\" (1.803 m)   Wt 232 lb (105.2 kg)   BMI 32.36 kg/m²     DATA:   I personally reviewed the visit EKG with the following interpretation: Sinus rhythm, possible old anterior lateral wall MI age undetermined, normal axis    ECHO: 9/26/2022,Summary   No previous echo for comparison. Technically adequate study. Micro-bubble contrast injected to enhance left ventricular visualization. Left ventricle size is normal.   Mild asymmetric septal hypertrophy. Ejection fraction is visually estimated at 55%. Apical hypokinesis   E/A flow reversal noted. Suggestive of diastolic dysfunction. Stress Test:     Angiography:   9/28/2022,  1. Percutaneous coronary intervention with a deployment of 4.0 x 38 mm [place] Resolute drug-eluting stent in the mid RCA. 3.  Conscious sedation using Versed and fentanyl. 9/26/2022,  1. PCI to first OM branch with deployment of 2.25 x 20 mm [place] Resolute  drug-eluting stent. 2.  Percutaneous coronary intervention with deployment of 2.25 x 22 mm [place] Resolute drug-eluting stent in first diagonal branch. 3.  Conscious sedation using Versed and fentanyl.     Cardiology Labs: BMP:    Lab Results   Component Value Date/Time     09/29/2022 04:12 AM    K 4.2 09/29/2022 04:12 AM    K 4.4 01/18/2021 08:47 AM     09/29/2022 04:12 AM CO2 20 09/29/2022 04:12 AM    BUN 10 09/29/2022 04:12 AM    CREATININE 0.7 09/29/2022 04:12 AM     CMP:    Lab Results   Component Value Date/Time     09/29/2022 04:12 AM    K 4.2 09/29/2022 04:12 AM    K 4.4 01/18/2021 08:47 AM     09/29/2022 04:12 AM    CO2 20 09/29/2022 04:12 AM    BUN 10 09/29/2022 04:12 AM    CREATININE 0.7 09/29/2022 04:12 AM    PROT 7.4 09/27/2022 04:59 AM     CBC:    Lab Results   Component Value Date/Time    WBC 9.0 09/29/2022 04:12 AM    RBC 4.65 09/29/2022 04:12 AM    HGB 14.5 09/29/2022 04:12 AM    HCT 43.2 09/29/2022 04:12 AM    MCV 92.9 09/29/2022 04:12 AM    RDW 13.3 09/29/2022 04:12 AM     09/29/2022 04:12 AM     PT/INR:  No results found for: PTINR  PT/INR Warfarin:  No components found for: PTPATWAR, PTINRWAR  PTT:    Lab Results   Component Value Date/Time    APTT 35.0 09/26/2022 05:55 AM     PTT Heparin:  No components found for: APTTHEP  Magnesium:  No results found for: MG  TSH:    Lab Results   Component Value Date/Time    TSH 0.991 07/23/2022 08:03 AM     TROPONIN:  No components found for: TROP  BNP:  No results found for: BNP  FASTING LIPID PANEL:    Lab Results   Component Value Date/Time    CHOL 150 08/16/2021 09:30 AM    HDL 32 07/23/2022 08:03 AM    TRIG 120 08/16/2021 09:30 AM     No orders to display     I have personally reviewed the laboratory, cardiac diagnostic and radiographic testing as outlined above:      IMPRESSION:  1.  CAD: Patient had cardiac catheterization on 9/28/2022 with the following findings,  #percutaneous coronary intervention with a deployment of 4.0 x 38 mm [place] Resolute drug-eluting stent in the mid RCA. Patient had cardiac catheterization on    9/26/2022 with the following findings,  # PCI to first OM branch with deployment of 2.25 x 20 mm [place] Resolute drug-eluting stent. # Percutaneous coronary intervention with deployment of 2.25 x 22 mm [place] Resolute drug-eluting stent in first diagonal branch.   2.  Hyperlipidemia: On statin  3. Family history of early CAD    RECOMMENDATIONS:   1. Continue current treatment (not on beta-blocker due to beta-blocker induced bradycardia). 2.  Risk of instent thrombosis due to premature discontinuation of either ASA or Brilinta discussed with patient at length  3.  Cardiac rehab  4. May return to work next week without any restriction from cardiac standpoint  5. Follow-up with Dr. Ivy Garrido as scheduled  6. Follow-up with Dr. Christy May in 3 months, sooner if symptomatic for any reason    I have reviewed my findings and recommendations with patient    Electronically signed by Liv Pettit MD on 10/3/2022 at 2:14 PM    NOTE: This report was transcribed using voice recognition software.  Every effort was made to ensure accuracy; however, inadvertent computerized transcription errors may be present

## 2022-10-20 PROBLEM — I25.119 CORONARY ARTERY DISEASE INVOLVING NATIVE CORONARY ARTERY OF NATIVE HEART WITH ANGINA PECTORIS (HCC): Status: ACTIVE | Noted: 2022-09-26

## 2023-02-03 ENCOUNTER — OFFICE VISIT (OUTPATIENT)
Dept: CARDIOLOGY CLINIC | Age: 58
End: 2023-02-03
Payer: COMMERCIAL

## 2023-02-03 VITALS
WEIGHT: 226 LBS | DIASTOLIC BLOOD PRESSURE: 82 MMHG | SYSTOLIC BLOOD PRESSURE: 150 MMHG | HEIGHT: 70 IN | RESPIRATION RATE: 16 BRPM | HEART RATE: 63 BPM | BODY MASS INDEX: 32.35 KG/M2

## 2023-02-03 DIAGNOSIS — I25.10 CAD IN NATIVE ARTERY: Primary | ICD-10-CM

## 2023-02-03 PROCEDURE — 99214 OFFICE O/P EST MOD 30 MIN: CPT | Performed by: INTERNAL MEDICINE

## 2023-02-03 PROCEDURE — 93000 ELECTROCARDIOGRAM COMPLETE: CPT | Performed by: INTERNAL MEDICINE

## 2023-02-03 RX ORDER — CLOPIDOGREL BISULFATE 75 MG/1
75 TABLET ORAL DAILY
Qty: 90 TABLET | Refills: 3 | Status: SHIPPED | OUTPATIENT
Start: 2023-02-03

## 2023-02-03 NOTE — PROGRESS NOTES
[facility] Cardiology Progress Note  Dr. Shannan Martinez      Referring Physician: Cinthya Flores MD  CHIEF COMPLAINT:   Chief Complaint   Patient presents with    Coronary Artery Disease     3 month       HISTORY OF PRESENT ILLNESS:   Patient is 62years old male with history of CAD s/p PCI to diagonal branch, OM branch and RCA, lateral wall MI, is here for follow-up appointment. Patient denies any chest pain, no shortness of breath, no lightheadedness, no dizziness, no palpitations, no pedal edema, no PND, no orthopnea, no syncope, no presyncopal episodes. Has been having insomnia due to Brilinta  Functional capacity is at baseline    Past Medical History:   Diagnosis Date    CAD (coronary artery disease)     Elevated liver enzymes     Hyperlipidemia     Impaired fasting glucose     Obesity (BMI 83.4-46.2)     34    Umbilical hernia          Past Surgical History:   Procedure Laterality Date    CORONARY ANGIOPLASTY WITH STENT PLACEMENT  09/27/2022    Dr. Yoel Méndez; 2.25x22 D1; 2.25x22 Cx, OM1    CORONARY ANGIOPLASTY WITH STENT PLACEMENT  09/28/2022    Dr Dayton Seguraer DANIEL 4.0x15 Mid RCA    EYE SURGERY           Current Outpatient Medications   Medication Sig Dispense Refill    aspirin 81 MG EC tablet Take 1 tablet by mouth daily 30 tablet 3    atorvastatin (LIPITOR) 80 MG tablet Take 1 tablet by mouth nightly 30 tablet 3    ticagrelor (BRILINTA) 90 MG TABS tablet Take 1 tablet by mouth 2 times daily 180 tablet 3    rosuvastatin (CRESTOR) 5 MG tablet TAKE ONE TABLET BY MOUTH AT BEDTIME (Patient not taking: Reported on 2/3/2023)       No current facility-administered medications for this visit.          Allergies as of 02/03/2023    (No Known Allergies)       Social History     Socioeconomic History    Marital status:      Spouse name: Not on file    Number of children: Not on file    Years of education: Not on file    Highest education level: Not on file   Occupational History    Not on file   Tobacco Use    Smoking status: Never    Smokeless tobacco: Never   Substance and Sexual Activity    Alcohol use: Yes     Alcohol/week: 10.0 standard drinks     Types: 10 Cans of beer per week     Comment: weekly    Drug use: No    Sexual activity: Not on file   Other Topics Concern    Not on file   Social History Narrative    Not on file     Social Determinants of Health     Financial Resource Strain: Not on file   Food Insecurity: Not on file   Transportation Needs: Not on file   Physical Activity: Not on file   Stress: Not on file   Social Connections: Not on file   Intimate Partner Violence: Not on file   Housing Stability: Not on file     Family history of early CAD    REVIEW OF SYSTEMS:   CONSTITUTIONAL:  negative for  fevers, chills, sweats and fatigue  HEENT:  negative for  tinnitus, earaches, nasal congestion and epistaxis  RESPIRATORY:  negative for  dry cough, cough with sputum, wheezing and hemoptysis  GASTROINTESTINAL:  negative for nausea, vomiting, diarrhea, constipation, pruritus and jaundice  HEMATOLOGIC/LYMPHATIC:  negative for easy bruising, bleeding, lymphadenopathy and petechiae  ENDOCRINE:  negative for heat intolerance, cold intolerance, tremor, hair loss and diabetic symptoms including neither polyuria nor polydipsia nor blurred vision  MUSCULOSKELETAL:  negative for  myalgias, arthralgias, joint swelling, stiff joints and decreased range of motion  NEUROLOGICAL:  negative for memory problems, speech problems, visual disturbance, dysphagia, weakness and numbness      PHYSICAL EXAM:   CONSTITUTIONAL:  awake, alert, cooperative, no apparent distress, and appears stated age  HEAD:  normocepalic, without obvious abnormality, atraumatic, pink, moist mucous membranes.  NECK:  Supple, symmetrical, trachea midline, no adenopathy, thyroid symmetric, not enlarged and no tenderness, skin normal  LUNGS:  No increased work of breathing, good air exchange, clear to auscultation bilaterally, no crackles or  wheezing  CARDIOVASCULAR:  Normal apical impulse, regular rate and rhythm, normal S1 and S2, no S3 or S4, and no murmur noted and no JVD, no carotid bruit, no pedal edema, good carotid upstroke bilaterally. ABDOMEN:  Soft, nontender, no masses, no hepatomegaly or splenomegaly, BS+  CHEST: nontender to palpation, expands symmetrically  MUSCULOSKELETAL:  No clubbing no cyanosis. there is no redness, warmth, or swelling of the joints  full range of motion noted  NEUROLOGIC:  Alert, awake,oriented x3. SKIN:  no bruising or bleeding, normal skin color, texture, turgor and no redness, warmth, or swelling      BP (!) 150/82   Pulse 63   Resp 16   Ht 5' 10\" (1.778 m)   Wt 226 lb (102.5 kg)   BMI 32.43 kg/m²     DATA:   I personally reviewed the visit EKG with the following interpretation: Sinus rhythm, RSR in V1, nonspecific T wave changes    EKG 10/3/2022, sinus rhythm, possible old anterior lateral wall MI age undetermined, normal axis    ECHO: 9/26/2022,Summary   No previous echo for comparison. Technically adequate study. Micro-bubble contrast injected to enhance left ventricular visualization. Left ventricle size is normal.   Mild asymmetric septal hypertrophy. Ejection fraction is visually estimated at 55%. Apical hypokinesis   E/A flow reversal noted. Suggestive of diastolic dysfunction. Stress Test:     Angiography:   9/28/2022,  1. Percutaneous coronary intervention with a deployment of 4.0 x 38 mm [place] Resolute drug-eluting stent in the mid RCA. 3.  Conscious sedation using Versed and fentanyl. 9/26/2022,  1. PCI to first OM branch with deployment of 2.25 x 20 mm [place] Resolute  drug-eluting stent. 2.  Percutaneous coronary intervention with deployment of 2.25 x 22 mm [place] Resolute drug-eluting stent in first diagonal branch. 3.  Conscious sedation using Versed and fentanyl.     Cardiology Labs: BMP:    Lab Results   Component Value Date/Time     09/29/2022 04:12 AM    K 4.2 09/29/2022 04:12 AM    K 4.4 01/18/2021 08:47 AM     09/29/2022 04:12 AM    CO2 20 09/29/2022 04:12 AM    BUN 10 09/29/2022 04:12 AM    CREATININE 0.7 09/29/2022 04:12 AM     CMP:    Lab Results   Component Value Date/Time     09/29/2022 04:12 AM    K 4.2 09/29/2022 04:12 AM    K 4.4 01/18/2021 08:47 AM     09/29/2022 04:12 AM    CO2 20 09/29/2022 04:12 AM    BUN 10 09/29/2022 04:12 AM    CREATININE 0.7 09/29/2022 04:12 AM    PROT 7.4 09/27/2022 04:59 AM     CBC:    Lab Results   Component Value Date/Time    WBC 9.0 09/29/2022 04:12 AM    RBC 4.65 09/29/2022 04:12 AM    HGB 14.5 09/29/2022 04:12 AM    HCT 43.2 09/29/2022 04:12 AM    MCV 92.9 09/29/2022 04:12 AM    RDW 13.3 09/29/2022 04:12 AM     09/29/2022 04:12 AM     PT/INR:  No results found for: PTINR  PT/INR Warfarin:  No components found for: PTPATWAR, PTINRWAR  PTT:    Lab Results   Component Value Date/Time    APTT 35.0 09/26/2022 05:55 AM     PTT Heparin:  No components found for: APTTHEP  Magnesium:  No results found for: MG  TSH:    Lab Results   Component Value Date/Time    TSH 0.991 07/23/2022 08:03 AM     TROPONIN:  No components found for: TROP  BNP:  No results found for: BNP  FASTING LIPID PANEL:    Lab Results   Component Value Date/Time    CHOL 150 08/16/2021 09:30 AM    HDL 32 07/23/2022 08:03 AM    TRIG 120 08/16/2021 09:30 AM     No orders to display     I have personally reviewed the laboratory, cardiac diagnostic and radiographic testing as outlined above:      IMPRESSION:  1.  CAD: Patient had cardiac catheterization on 9/28/2022 with the following findings,  #percutaneous coronary intervention with a deployment of 4.0 x 38 mm [place] Resolute drug-eluting stent in the mid RCA. Patient had cardiac catheterization on  9/26/2022 with the following findings,  # PCI to first OM branch with deployment of 2.25 x 20 mm [place] Resolute drug-eluting stent.   # Percutaneous coronary intervention with deployment of 2.25 x 22 mm [place] Resolute drug-eluting stent in first diagonal branch. 2.  Hyperlipidemia: On statin  3. Family history of early CAD    RECOMMENDATIONS:   1.  will change Brilinta to Plavix  2. Continue the rest of treatment, (not on beta-blocker due to beta-blocker induced bradycardia). 3.  Risk of instent thrombosis due to premature discontinuation of either ASA or Brilinta discussed with patient at length  4. Follow-up with Dr. Nolan Benoit as scheduled  5. Follow-up with Dr. Lucille Man in 6 months, sooner if symptomatic for any reason    I have reviewed my findings and recommendations with patient    Electronically signed by Jason Ruiz MD on 2/3/2023 at 3:09 PM    NOTE: This report was transcribed using voice recognition software.  Every effort was made to ensure accuracy; however, inadvertent computerized transcription errors may be present

## 2023-05-04 RX ORDER — ATORVASTATIN CALCIUM 80 MG/1
80 TABLET, FILM COATED ORAL NIGHTLY
Qty: 90 TABLET | Refills: 3 | Status: SHIPPED | OUTPATIENT
Start: 2023-05-04

## 2023-05-04 RX ORDER — CLOPIDOGREL BISULFATE 75 MG/1
75 TABLET ORAL DAILY
Qty: 90 TABLET | Refills: 3 | Status: SHIPPED | OUTPATIENT
Start: 2023-05-04

## 2023-08-21 ENCOUNTER — OFFICE VISIT (OUTPATIENT)
Dept: CARDIOLOGY CLINIC | Age: 58
End: 2023-08-21
Payer: COMMERCIAL

## 2023-08-21 VITALS
SYSTOLIC BLOOD PRESSURE: 124 MMHG | WEIGHT: 226 LBS | RESPIRATION RATE: 16 BRPM | BODY MASS INDEX: 32.35 KG/M2 | HEART RATE: 67 BPM | HEIGHT: 70 IN | DIASTOLIC BLOOD PRESSURE: 62 MMHG

## 2023-08-21 DIAGNOSIS — I25.10 CAD IN NATIVE ARTERY: Primary | ICD-10-CM

## 2023-08-21 PROCEDURE — 99213 OFFICE O/P EST LOW 20 MIN: CPT | Performed by: INTERNAL MEDICINE

## 2023-08-21 PROCEDURE — 93000 ELECTROCARDIOGRAM COMPLETE: CPT | Performed by: INTERNAL MEDICINE

## 2023-08-21 NOTE — PROGRESS NOTES
[facility] Cardiology Progress Note  Dr. Ethel Dasilva      Referring Physician: Jamie Ojeda MD  CHIEF COMPLAINT:   Chief Complaint   Patient presents with    Coronary Artery Disease     6 month        HISTORY OF PRESENT ILLNESS:   Patient is 62years old male with history of CAD s/p PCI to diagonal branch, OM branch and RCA, lateral wall MI, is here for follow-up appointment. Patient denies any chest pain, no shortness of breath, no lightheadedness, no dizziness, no palpitations, no pedal edema, no PND, no orthopnea, no syncope, no presyncopal episodes. Functional capacity is good for age    Past Medical History:   Diagnosis Date    CAD (coronary artery disease)     Elevated liver enzymes     Hyperlipidemia     Impaired fasting glucose     Obesity (BMI 42.5-69.6)     34    Umbilical hernia          Past Surgical History:   Procedure Laterality Date    CORONARY ANGIOPLASTY WITH STENT PLACEMENT  09/27/2022    Dr. Luba Machuca; 2.25x22 D1; 2.25x22 Cx, OM1    CORONARY ANGIOPLASTY WITH STENT PLACEMENT  09/28/2022    Dr Lalit Del Cid- [place] DANIEL 4.0x15 Mid RCA    EYE SURGERY           Current Outpatient Medications   Medication Sig Dispense Refill    atorvastatin (LIPITOR) 80 MG tablet Take 1 tablet by mouth nightly 90 tablet 3    clopidogrel (PLAVIX) 75 MG tablet Take 1 tablet by mouth daily 90 tablet 3    aspirin 81 MG EC tablet Take 1 tablet by mouth daily 30 tablet 3    rosuvastatin (CRESTOR) 5 MG tablet TAKE ONE TABLET BY MOUTH AT BEDTIME (Patient not taking: Reported on 2/3/2023)       No current facility-administered medications for this visit.          Allergies as of 08/21/2023    (No Known Allergies)       Social History     Socioeconomic History    Marital status:      Spouse name: Not on file    Number of children: Not on file    Years of education: Not on file    Highest education level: Not on file   Occupational History    Not on file   Tobacco Use    Smoking status: Never    Smokeless

## 2024-01-22 RX ORDER — ATORVASTATIN CALCIUM 80 MG/1
80 TABLET, FILM COATED ORAL NIGHTLY
Qty: 90 TABLET | Refills: 3 | Status: SHIPPED | OUTPATIENT
Start: 2024-01-22

## 2024-01-22 RX ORDER — CLOPIDOGREL BISULFATE 75 MG/1
75 TABLET ORAL DAILY
Qty: 90 TABLET | Refills: 3 | Status: SHIPPED | OUTPATIENT
Start: 2024-01-22

## 2024-03-02 NOTE — PROGRESS NOTES
Patient called and stated he felt a generalized numbness in his bilateral upper and lower extremities, he also felt that his arms and lefts felt very heavy. Patient reports midsternal chest pressure. EKG obtained, Dr Sebastian Saleh notified. Orders obtained. No

## 2024-05-06 SDOH — HEALTH STABILITY: PHYSICAL HEALTH: ON AVERAGE, HOW MANY MINUTES DO YOU ENGAGE IN EXERCISE AT THIS LEVEL?: 40 MIN

## 2024-05-06 SDOH — HEALTH STABILITY: PHYSICAL HEALTH: ON AVERAGE, HOW MANY DAYS PER WEEK DO YOU ENGAGE IN MODERATE TO STRENUOUS EXERCISE (LIKE A BRISK WALK)?: 6 DAYS

## 2024-05-07 ENCOUNTER — OFFICE VISIT (OUTPATIENT)
Dept: INTERNAL MEDICINE CLINIC | Age: 59
End: 2024-05-07
Payer: COMMERCIAL

## 2024-05-07 VITALS
OXYGEN SATURATION: 96 % | HEIGHT: 70 IN | WEIGHT: 232 LBS | HEART RATE: 86 BPM | SYSTOLIC BLOOD PRESSURE: 118 MMHG | DIASTOLIC BLOOD PRESSURE: 82 MMHG | TEMPERATURE: 97.9 F | BODY MASS INDEX: 33.21 KG/M2

## 2024-05-07 DIAGNOSIS — Z76.89 ESTABLISHING CARE WITH NEW DOCTOR, ENCOUNTER FOR: Primary | ICD-10-CM

## 2024-05-07 DIAGNOSIS — E78.00 PURE HYPERCHOLESTEROLEMIA: ICD-10-CM

## 2024-05-07 DIAGNOSIS — Z12.5 SCREENING FOR PROSTATE CANCER: ICD-10-CM

## 2024-05-07 DIAGNOSIS — I25.119 CORONARY ARTERY DISEASE INVOLVING NATIVE CORONARY ARTERY OF NATIVE HEART WITH ANGINA PECTORIS (HCC): ICD-10-CM

## 2024-05-07 DIAGNOSIS — Z12.11 SCREENING FOR MALIGNANT NEOPLASM OF COLON: ICD-10-CM

## 2024-05-07 DIAGNOSIS — E55.9 VITAMIN D DEFICIENCY: ICD-10-CM

## 2024-05-07 PROCEDURE — 99214 OFFICE O/P EST MOD 30 MIN: CPT | Performed by: NEUROMUSCULOSKELETAL MEDICINE & OMM

## 2024-05-07 SDOH — ECONOMIC STABILITY: FOOD INSECURITY: WITHIN THE PAST 12 MONTHS, YOU WORRIED THAT YOUR FOOD WOULD RUN OUT BEFORE YOU GOT MONEY TO BUY MORE.: PATIENT DECLINED

## 2024-05-07 SDOH — ECONOMIC STABILITY: INCOME INSECURITY: HOW HARD IS IT FOR YOU TO PAY FOR THE VERY BASICS LIKE FOOD, HOUSING, MEDICAL CARE, AND HEATING?: PATIENT DECLINED

## 2024-05-07 SDOH — ECONOMIC STABILITY: FOOD INSECURITY: WITHIN THE PAST 12 MONTHS, THE FOOD YOU BOUGHT JUST DIDN'T LAST AND YOU DIDN'T HAVE MONEY TO GET MORE.: PATIENT DECLINED

## 2024-05-07 SDOH — ECONOMIC STABILITY: HOUSING INSECURITY
IN THE LAST 12 MONTHS, WAS THERE A TIME WHEN YOU DID NOT HAVE A STEADY PLACE TO SLEEP OR SLEPT IN A SHELTER (INCLUDING NOW)?: PATIENT DECLINED

## 2024-05-07 ASSESSMENT — PATIENT HEALTH QUESTIONNAIRE - PHQ9
2. FEELING DOWN, DEPRESSED OR HOPELESS: NOT AT ALL
SUM OF ALL RESPONSES TO PHQ QUESTIONS 1-9: 0
SUM OF ALL RESPONSES TO PHQ QUESTIONS 1-9: 0
1. LITTLE INTEREST OR PLEASURE IN DOING THINGS: NOT AT ALL
SUM OF ALL RESPONSES TO PHQ QUESTIONS 1-9: 0
SUM OF ALL RESPONSES TO PHQ9 QUESTIONS 1 & 2: 0
SUM OF ALL RESPONSES TO PHQ QUESTIONS 1-9: 0

## 2024-05-07 ASSESSMENT — ENCOUNTER SYMPTOMS
CHOKING: 0
BACK PAIN: 0
DIARRHEA: 0
ABDOMINAL PAIN: 0
CONSTIPATION: 0
ABDOMINAL DISTENTION: 0
CHEST TIGHTNESS: 0
SHORTNESS OF BREATH: 0
NAUSEA: 0
VOMITING: 0
WHEEZING: 0
COUGH: 0

## 2024-05-07 NOTE — PROGRESS NOTES
gR Bain (:  1965) is a 58 y.o. male,Established patient, here for evaluation of the following chief complaint(s):  Established New Doctor (Establish care-Dr. Guevara pt) and Check-Up (Regular check up)      Assessment & Plan   ASSESSMENT/PLAN:  1. Establishing care with new doctor, encounter for  2. Pure hypercholesterolemia  Comments:  Chronic condition well-controlled.  Will check lipid panel with upcoming blood work.  Continue patient on Lipitor 80 mg daily.  Orders:  -     Comprehensive Metabolic Panel; Future  -     Lipid Panel; Future  3. Coronary artery disease involving native coronary artery of native heart with angina pectoris (HCC)  Comments:  3 stents were placed in 2022, per Dr. Crystal.  Continue patient on high-dose statin, and ASA.  Orders:  -     CBC with Auto Differential; Future  4. Vitamin D deficiency  Comments:  Chronic condition well-controlled will check vitamin D level with upcoming fasting blood work and treat accordingly.  Orders:  -     Vitamin D 25 Hydroxy; Future  5. Screening for malignant neoplasm of colon  -     Cologuard (Fecal DNA Colorectal Cancer Screening)  6. Screening for prostate cancer  -     PSA Screening; Future      Return in about 6 months (around 2024) for to monitor medical conditions.         Subjective   SUBJECTIVE/OBJECTIVE:  HPI 59 yo male here for Established New Doctor (Establish care-Dr. Guevara pt) and Check-Up (Regular check up)    Review of Systems   Constitutional:  Negative for activity change, appetite change, chills, diaphoresis, fatigue and fever.   Respiratory:  Negative for cough, choking, chest tightness, shortness of breath and wheezing.    Cardiovascular:  Negative for chest pain, palpitations and leg swelling.   Gastrointestinal:  Negative for abdominal distention, abdominal pain, constipation, diarrhea, nausea and vomiting.   Genitourinary:  Negative for difficulty urinating.   Musculoskeletal:  Negative for back pain

## 2024-05-19 LAB — NONINV COLON CA DNA+OCC BLD SCRN STL QL: NEGATIVE

## 2024-06-04 ENCOUNTER — OFFICE VISIT (OUTPATIENT)
Dept: CARDIOLOGY CLINIC | Age: 59
End: 2024-06-04
Payer: COMMERCIAL

## 2024-06-04 VITALS
HEART RATE: 61 BPM | RESPIRATION RATE: 16 BRPM | DIASTOLIC BLOOD PRESSURE: 84 MMHG | HEIGHT: 70 IN | SYSTOLIC BLOOD PRESSURE: 118 MMHG | BODY MASS INDEX: 33.21 KG/M2 | WEIGHT: 232 LBS

## 2024-06-04 DIAGNOSIS — I25.10 CAD IN NATIVE ARTERY: Primary | ICD-10-CM

## 2024-06-04 PROCEDURE — 93000 ELECTROCARDIOGRAM COMPLETE: CPT | Performed by: INTERNAL MEDICINE

## 2024-06-04 PROCEDURE — 99213 OFFICE O/P EST LOW 20 MIN: CPT | Performed by: INTERNAL MEDICINE

## 2024-06-04 RX ORDER — CLOPIDOGREL BISULFATE 75 MG/1
75 TABLET ORAL DAILY
Qty: 90 TABLET | Refills: 3 | Status: SHIPPED | OUTPATIENT
Start: 2024-06-04

## 2024-06-04 RX ORDER — ASPIRIN 81 MG/1
81 TABLET ORAL DAILY
Qty: 30 TABLET | Refills: 3 | Status: SHIPPED | OUTPATIENT
Start: 2024-06-04

## 2024-06-04 RX ORDER — ATORVASTATIN CALCIUM 80 MG/1
80 TABLET, FILM COATED ORAL NIGHTLY
Qty: 90 TABLET | Refills: 3 | Status: SHIPPED | OUTPATIENT
Start: 2024-06-04

## 2024-06-07 PROBLEM — I21.3 STEMI (ST ELEVATION MYOCARDIAL INFARCTION) (HCC): Status: RESOLVED | Noted: 2022-09-26 | Resolved: 2024-06-07

## 2024-06-07 PROBLEM — I25.2 HISTORY OF ACUTE MYOCARDIAL INFARCTION: Status: ACTIVE | Noted: 2022-09-26

## 2024-10-31 ENCOUNTER — LAB (OUTPATIENT)
Dept: INTERNAL MEDICINE CLINIC | Age: 59
End: 2024-10-31
Payer: COMMERCIAL

## 2024-10-31 DIAGNOSIS — Z12.5 SCREENING FOR PROSTATE CANCER: ICD-10-CM

## 2024-10-31 DIAGNOSIS — Z00.00 BLOOD TESTS FOR ROUTINE GENERAL PHYSICAL EXAMINATION: Primary | ICD-10-CM

## 2024-10-31 DIAGNOSIS — E55.9 VITAMIN D DEFICIENCY: ICD-10-CM

## 2024-10-31 DIAGNOSIS — I25.119 CORONARY ARTERY DISEASE INVOLVING NATIVE CORONARY ARTERY OF NATIVE HEART WITH ANGINA PECTORIS (HCC): ICD-10-CM

## 2024-10-31 DIAGNOSIS — E78.00 PURE HYPERCHOLESTEROLEMIA: ICD-10-CM

## 2024-10-31 LAB
ALBUMIN: 4.4 G/DL (ref 3.5–5.2)
ALP BLD-CCNC: 90 U/L (ref 40–129)
ALT SERPL-CCNC: 22 U/L (ref 0–40)
ANION GAP SERPL CALCULATED.3IONS-SCNC: 12 MMOL/L (ref 7–16)
AST SERPL-CCNC: 29 U/L (ref 0–39)
BASOPHILS ABSOLUTE: 0.06 K/UL (ref 0–0.2)
BASOPHILS RELATIVE PERCENT: 1 % (ref 0–2)
BILIRUB SERPL-MCNC: 0.6 MG/DL (ref 0–1.2)
BUN BLDV-MCNC: 12 MG/DL (ref 6–20)
CALCIUM SERPL-MCNC: 9.1 MG/DL (ref 8.6–10.2)
CHLORIDE BLD-SCNC: 106 MMOL/L (ref 98–107)
CHOLESTEROL, TOTAL: 120 MG/DL
CO2: 22 MMOL/L (ref 22–29)
CREAT SERPL-MCNC: 0.7 MG/DL (ref 0.7–1.2)
EOSINOPHILS ABSOLUTE: 0.28 K/UL (ref 0.05–0.5)
EOSINOPHILS RELATIVE PERCENT: 5 % (ref 0–6)
GFR, ESTIMATED: >90 ML/MIN/1.73M2
GLUCOSE BLD-MCNC: 97 MG/DL (ref 74–99)
HCT VFR BLD CALC: 45.8 % (ref 37–54)
HDLC SERPL-MCNC: 27 MG/DL
HEMOGLOBIN: 14.6 G/DL (ref 12.5–16.5)
IMMATURE GRANULOCYTES %: 0 % (ref 0–5)
IMMATURE GRANULOCYTES ABSOLUTE: <0.03 K/UL (ref 0–0.58)
LDL CHOLESTEROL: 73 MG/DL
LYMPHOCYTES ABSOLUTE: 1.58 K/UL (ref 1.5–4)
LYMPHOCYTES RELATIVE PERCENT: 28 % (ref 20–42)
MCH RBC QN AUTO: 30.5 PG (ref 26–35)
MCHC RBC AUTO-ENTMCNC: 31.9 G/DL (ref 32–34.5)
MCV RBC AUTO: 95.8 FL (ref 80–99.9)
MONOCYTES ABSOLUTE: 0.65 K/UL (ref 0.1–0.95)
MONOCYTES RELATIVE PERCENT: 12 % (ref 2–12)
NEUTROPHILS ABSOLUTE: 3.01 K/UL (ref 1.8–7.3)
NEUTROPHILS RELATIVE PERCENT: 54 % (ref 43–80)
PDW BLD-RTO: 13.6 % (ref 11.5–15)
PLATELET # BLD: 222 K/UL (ref 130–450)
PMV BLD AUTO: 10.6 FL (ref 7–12)
POTASSIUM SERPL-SCNC: 4.8 MMOL/L (ref 3.5–5)
PROSTATE SPECIFIC ANTIGEN: 1.13 NG/ML (ref 0–4)
RBC # BLD: 4.78 M/UL (ref 3.8–5.8)
SODIUM BLD-SCNC: 140 MMOL/L (ref 132–146)
TOTAL PROTEIN: 7.2 G/DL (ref 6.4–8.3)
TRIGL SERPL-MCNC: 101 MG/DL
VITAMIN D 25-HYDROXY: 31.3 NG/ML (ref 30–100)
VLDLC SERPL CALC-MCNC: 20 MG/DL
WBC # BLD: 5.6 K/UL (ref 4.5–11.5)

## 2024-10-31 PROCEDURE — 36415 COLL VENOUS BLD VENIPUNCTURE: CPT | Performed by: NEUROMUSCULOSKELETAL MEDICINE & OMM

## 2024-11-07 ENCOUNTER — HOSPITAL ENCOUNTER (OUTPATIENT)
Age: 59
Discharge: HOME | End: 2024-11-09
Payer: COMMERCIAL

## 2024-11-07 ENCOUNTER — OFFICE VISIT (OUTPATIENT)
Dept: INTERNAL MEDICINE CLINIC | Age: 59
End: 2024-11-07

## 2024-11-07 ENCOUNTER — HOSPITAL ENCOUNTER (OUTPATIENT)
Dept: GENERAL RADIOLOGY | Age: 59
Discharge: HOME | End: 2024-11-09
Payer: COMMERCIAL

## 2024-11-07 VITALS
BODY MASS INDEX: 32.5 KG/M2 | SYSTOLIC BLOOD PRESSURE: 136 MMHG | TEMPERATURE: 98.1 F | WEIGHT: 227 LBS | OXYGEN SATURATION: 98 % | HEIGHT: 70 IN | HEART RATE: 71 BPM | DIASTOLIC BLOOD PRESSURE: 84 MMHG

## 2024-11-07 DIAGNOSIS — R73.09 ELEVATED HEMOGLOBIN A1C: ICD-10-CM

## 2024-11-07 DIAGNOSIS — E78.00 PURE HYPERCHOLESTEROLEMIA: ICD-10-CM

## 2024-11-07 DIAGNOSIS — M25.562 ACUTE PAIN OF LEFT KNEE: ICD-10-CM

## 2024-11-07 DIAGNOSIS — E55.9 VITAMIN D DEFICIENCY: ICD-10-CM

## 2024-11-07 DIAGNOSIS — I25.119 CORONARY ARTERY DISEASE INVOLVING NATIVE CORONARY ARTERY OF NATIVE HEART WITH ANGINA PECTORIS (HCC): ICD-10-CM

## 2024-11-07 DIAGNOSIS — M25.562 ACUTE PAIN OF LEFT KNEE: Primary | ICD-10-CM

## 2024-11-07 LAB — HBA1C MFR BLD: 5.8 %

## 2024-11-07 PROCEDURE — 73560 X-RAY EXAM OF KNEE 1 OR 2: CPT

## 2024-11-07 ASSESSMENT — ENCOUNTER SYMPTOMS
SHORTNESS OF BREATH: 0
CHEST TIGHTNESS: 0
ABDOMINAL PAIN: 0
BLOOD IN STOOL: 0
VOMITING: 0
CHOKING: 0
COUGH: 0
DIARRHEA: 0
WHEEZING: 0
CONSTIPATION: 0
BACK PAIN: 0
NAUSEA: 0
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
## 2024-11-29 SDOH — HEALTH STABILITY: PHYSICAL HEALTH: ON AVERAGE, HOW MANY MINUTES DO YOU ENGAGE IN EXERCISE AT THIS LEVEL?: 60 MIN

## 2024-12-02 ENCOUNTER — OFFICE VISIT (OUTPATIENT)
Dept: ORTHOPEDIC SURGERY | Age: 59
End: 2024-12-02

## 2024-12-02 VITALS — BODY MASS INDEX: 31.5 KG/M2 | WEIGHT: 220 LBS | HEIGHT: 70 IN

## 2024-12-02 DIAGNOSIS — M17.12 PRIMARY OSTEOARTHRITIS OF LEFT KNEE: Primary | ICD-10-CM

## 2024-12-02 NOTE — PROGRESS NOTES
Chief Complaint   Patient presents with    Knee Pain     Left Knee x 1 month, felt a pop while walking down the stairs, had swelling, used a brace with some relief.  States of tightness feeling in the knee.  No previous left knee pain.         Subjective:     Patient ID: Rg Bain is a 59 y.o..  male    Knee Pain  Patient complains of left knee pain. This is evaluated as a personal injury. There was not a history of injury.   The pain began 1 month ago. The pain is located anterior. He describes  His symptoms as aching, sharp, shooting, and stabbing. He has experienced popping, clicking, locking, and giving way in the affected knee.  The patient has had pain with kneeling, squating, and climbing stairs.  Symptoms improve with rest, heat, ice, medication: NSAID used but not effective. The symptoms are worse with activity. The knee has given out or felt unstable. The patient cannot bend and straighten the knee fully.  The patient is active in none. Treatment to date has been ice, NSAID's, brace, without significant relief. The patient is working. The patients occupation is .     Past Medical History:   Diagnosis Date    Acute ST elevation myocardial infarction (STEMI) of lateral wall (Formerly [facility]) 9/26/2022    CAD (coronary artery disease)     Elevated liver enzymes     Hyperlipidemia     Impaired fasting glucose     Obesity (BMI 30.0-34.9)     34    STEMI (ST elevation myocardial infarction) (Formerly [facility]) 9/26/2022    Umbilical hernia      Past Surgical History:   Procedure Laterality Date    CORONARY ANGIOPLASTY WITH STENT PLACEMENT  09/27/2022    Dr. Marah Lala; 2.25x22 D1; 2.25x22 Cx, OM1    CORONARY ANGIOPLASTY WITH STENT PLACEMENT  09/28/2022    Dr Merry Lala DANIEL 4.0x15 Mid RCA    EYE SURGERY      HERNIA REPAIR         Current Outpatient Medications:     clopidogrel (PLAVIX) 75 MG tablet, Take 1 tablet by mouth daily, Disp: 90 tablet, Rfl: 3    atorvastatin (LIPITOR) 80 MG tablet, Take 1

## 2024-12-05 RX ORDER — TRIAMCINOLONE ACETONIDE 40 MG/ML
40 INJECTION, SUSPENSION INTRA-ARTICULAR; INTRAMUSCULAR ONCE
Status: COMPLETED | OUTPATIENT
Start: 2024-12-05 | End: 2024-12-05

## 2024-12-05 RX ADMIN — TRIAMCINOLONE ACETONIDE 40 MG: 40 INJECTION, SUSPENSION INTRA-ARTICULAR; INTRAMUSCULAR at 12:44

## 2025-02-03 RX ORDER — CLOPIDOGREL BISULFATE 75 MG/1
75 TABLET ORAL DAILY
Qty: 90 TABLET | Refills: 0 | Status: SHIPPED | OUTPATIENT
Start: 2025-02-03

## 2025-02-10 SDOH — ECONOMIC STABILITY: FOOD INSECURITY: WITHIN THE PAST 12 MONTHS, THE FOOD YOU BOUGHT JUST DIDN'T LAST AND YOU DIDN'T HAVE MONEY TO GET MORE.: SOMETIMES TRUE

## 2025-02-10 SDOH — ECONOMIC STABILITY: FOOD INSECURITY: WITHIN THE PAST 12 MONTHS, YOU WORRIED THAT YOUR FOOD WOULD RUN OUT BEFORE YOU GOT MONEY TO BUY MORE.: NEVER TRUE

## 2025-02-10 SDOH — ECONOMIC STABILITY: INCOME INSECURITY: IN THE LAST 12 MONTHS, WAS THERE A TIME WHEN YOU WERE NOT ABLE TO PAY THE MORTGAGE OR RENT ON TIME?: NO

## 2025-02-10 SDOH — ECONOMIC STABILITY: TRANSPORTATION INSECURITY
IN THE PAST 12 MONTHS, HAS LACK OF TRANSPORTATION KEPT YOU FROM MEETINGS, WORK, OR FROM GETTING THINGS NEEDED FOR DAILY LIVING?: NO

## 2025-02-10 SDOH — ECONOMIC STABILITY: TRANSPORTATION INSECURITY
IN THE PAST 12 MONTHS, HAS THE LACK OF TRANSPORTATION KEPT YOU FROM MEDICAL APPOINTMENTS OR FROM GETTING MEDICATIONS?: NO

## 2025-02-10 ASSESSMENT — PATIENT HEALTH QUESTIONNAIRE - PHQ9
1. LITTLE INTEREST OR PLEASURE IN DOING THINGS: NOT AT ALL
SUM OF ALL RESPONSES TO PHQ QUESTIONS 1-9: 0
2. FEELING DOWN, DEPRESSED OR HOPELESS: NOT AT ALL
SUM OF ALL RESPONSES TO PHQ9 QUESTIONS 1 & 2: 0
1. LITTLE INTEREST OR PLEASURE IN DOING THINGS: NOT AT ALL
2. FEELING DOWN, DEPRESSED OR HOPELESS: NOT AT ALL
SUM OF ALL RESPONSES TO PHQ QUESTIONS 1-9: 0
SUM OF ALL RESPONSES TO PHQ QUESTIONS 1-9: 0
SUM OF ALL RESPONSES TO PHQ9 QUESTIONS 1 & 2: 0
SUM OF ALL RESPONSES TO PHQ QUESTIONS 1-9: 0

## 2025-02-13 ENCOUNTER — OFFICE VISIT (OUTPATIENT)
Dept: INTERNAL MEDICINE CLINIC | Age: 60
End: 2025-02-13

## 2025-02-13 VITALS
DIASTOLIC BLOOD PRESSURE: 84 MMHG | OXYGEN SATURATION: 94 % | HEIGHT: 70 IN | BODY MASS INDEX: 32.21 KG/M2 | WEIGHT: 225 LBS | HEART RATE: 77 BPM | TEMPERATURE: 98.1 F | SYSTOLIC BLOOD PRESSURE: 124 MMHG

## 2025-02-13 DIAGNOSIS — R73.03 PRE-DIABETES: ICD-10-CM

## 2025-02-13 DIAGNOSIS — M25.562 ACUTE PAIN OF LEFT KNEE: ICD-10-CM

## 2025-02-13 DIAGNOSIS — E55.9 VITAMIN D DEFICIENCY: ICD-10-CM

## 2025-02-13 DIAGNOSIS — R52 GENERALIZED BODY ACHES: Primary | ICD-10-CM

## 2025-02-13 DIAGNOSIS — E78.00 PURE HYPERCHOLESTEROLEMIA: ICD-10-CM

## 2025-02-13 DIAGNOSIS — J06.9 URI WITH COUGH AND CONGESTION: ICD-10-CM

## 2025-02-13 DIAGNOSIS — I25.119 CORONARY ARTERY DISEASE INVOLVING NATIVE CORONARY ARTERY OF NATIVE HEART WITH ANGINA PECTORIS (HCC): ICD-10-CM

## 2025-02-13 LAB
HBA1C MFR BLD: 5.9 %
INFLUENZA A ANTIGEN, POC: NEGATIVE
INFLUENZA B ANTIGEN, POC: NEGATIVE
LOT EXPIRE DATE: NORMAL
LOT KIT NUMBER: NORMAL
SARS-COV-2, POC: NORMAL
VALID INTERNAL CONTROL: NORMAL
VENDOR AND KIT NAME POC: NORMAL

## 2025-02-13 RX ORDER — BROMPHENIRAMINE MALEATE, PSEUDOEPHEDRINE HYDROCHLORIDE, AND DEXTROMETHORPHAN HYDROBROMIDE 2; 30; 10 MG/5ML; MG/5ML; MG/5ML
SYRUP ORAL
COMMUNITY
Start: 2025-01-15

## 2025-02-13 ASSESSMENT — ENCOUNTER SYMPTOMS
NAUSEA: 0
DIARRHEA: 0
SHORTNESS OF BREATH: 0
ABDOMINAL PAIN: 0
BLOOD IN STOOL: 0
COUGH: 1
ABDOMINAL DISTENTION: 0
CHOKING: 0
CHEST TIGHTNESS: 0
VOMITING: 0
CONSTIPATION: 0

## 2025-02-13 NOTE — PROGRESS NOTES
patient were advised that Artificial Intelligence will be utilized during this visit to record, process the conversation to generate a clinical note and to support improvement of the AI technology. The patient (or guardian, if applicable) and other individuals in attendance at the appointment consented to the use of AI, including the recording.      An electronic signature was used to authenticate this note.    --Samuel Quintero, DO

## 2025-02-14 ENCOUNTER — TELEPHONE (OUTPATIENT)
Dept: CARDIOLOGY CLINIC | Age: 60
End: 2025-02-14

## 2025-02-18 ENCOUNTER — OFFICE VISIT (OUTPATIENT)
Dept: INTERNAL MEDICINE CLINIC | Age: 60
End: 2025-02-18

## 2025-02-18 VITALS
DIASTOLIC BLOOD PRESSURE: 78 MMHG | BODY MASS INDEX: 32.07 KG/M2 | TEMPERATURE: 97.7 F | WEIGHT: 224 LBS | OXYGEN SATURATION: 96 % | HEART RATE: 86 BPM | SYSTOLIC BLOOD PRESSURE: 112 MMHG | HEIGHT: 70 IN

## 2025-02-18 DIAGNOSIS — J22 LOWER RESPIRATORY INFECTION: Primary | ICD-10-CM

## 2025-02-18 DIAGNOSIS — R09.89 CHEST CONGESTION: ICD-10-CM

## 2025-02-18 DIAGNOSIS — R52 GENERALIZED BODY ACHES: ICD-10-CM

## 2025-02-18 DIAGNOSIS — J06.9 URI WITH COUGH AND CONGESTION: ICD-10-CM

## 2025-02-18 LAB
INFLUENZA A ANTIGEN, POC: NEGATIVE
INFLUENZA B ANTIGEN, POC: NEGATIVE
LOT EXPIRE DATE: NORMAL
LOT KIT NUMBER: NORMAL
SARS-COV-2, POC: NORMAL
VALID INTERNAL CONTROL: NORMAL
VENDOR AND KIT NAME POC: NORMAL

## 2025-02-18 RX ORDER — BROMPHENIRAMINE MALEATE, PSEUDOEPHEDRINE HYDROCHLORIDE, AND DEXTROMETHORPHAN HYDROBROMIDE 2; 30; 10 MG/5ML; MG/5ML; MG/5ML
5 SYRUP ORAL 4 TIMES DAILY PRN
Qty: 120 ML | Refills: 0 | Status: SHIPPED | OUTPATIENT
Start: 2025-02-18

## 2025-02-18 RX ORDER — DOXYCYCLINE HYCLATE 100 MG
100 TABLET ORAL 2 TIMES DAILY
Qty: 20 TABLET | Refills: 0 | Status: SHIPPED | OUTPATIENT
Start: 2025-02-18 | End: 2025-02-28

## 2025-02-18 RX ORDER — METHYLPREDNISOLONE 4 MG/1
TABLET ORAL
Qty: 21 TABLET | Refills: 0 | Status: SHIPPED | OUTPATIENT
Start: 2025-02-18 | End: 2025-02-23

## 2025-02-18 ASSESSMENT — ENCOUNTER SYMPTOMS
VOMITING: 0
CHEST TIGHTNESS: 0
CONSTIPATION: 0
ABDOMINAL PAIN: 0
SHORTNESS OF BREATH: 0
RHINORRHEA: 1
WHEEZING: 1
NAUSEA: 0
DIARRHEA: 0
CHOKING: 0
COUGH: 1
BACK PAIN: 0

## 2025-02-18 NOTE — PROGRESS NOTES
Rg Bain (:  1965) is a 59 y.o. male, Established patient, here for evaluation of the following chief complaint(s):  Congestion, Wheezing (Wheezing/gargling at night), no appetite, and Fatigue (weakness)         Assessment & Plan  1. Bronchitis.  He reports a cough with orange-colored mucus, congestion, and wheezing, which started around . He has been taking cough syrup and Q-Tussin but experienced a nosebleed, leading him to reduce the dosage. He does not have a history of asthma or COPD. He has been experiencing fever and chills for the past couple of weeks, which started around the same time his grandchildren were ill. He reports no body aches. His COVID-19 and influenza A and B tests were negative. He will be prescribed doxycycline to be taken twice daily for 10 days, with food to mitigate potential gastrointestinal upset. Additionally, a steroid will be administered. He is advised to continue with Bromfed DM cough syrup, taking 1 teaspoon every 6 hours as needed for cough and congestion.    2. Fatigue and weakness.  He reports feeling fatigued and weak, with a loss of appetite. These symptoms have been ongoing for about two weeks.    3. Postnasal drainage.  He reports postnasal drainage, which is managed with medication. He experiences a runny nose when not on medication.    Follow-up  The patient will follow up on 2024, or sooner if his condition deteriorates or symptoms intensify.    Results  Laboratory Studies  COVID-19 test was negative. Influenza A and B tests were negative.  1. Lower respiratory infection  -     doxycycline hyclate (VIBRA-TABS) 100 MG tablet; Take 1 tablet by mouth 2 times daily for 10 days, Disp-20 tablet, R-0Normal  -     methylPREDNISolone (MEDROL DOSEPACK) 4 MG tablet; Take 6 tablets by mouth daily for 1 day, THEN 5 tablets daily for 1 day, THEN 4 tablets daily for 1 day, THEN 3 tablets daily for 1 day, THEN 2 tablets daily for 1 day, THEN 1 tablet daily

## 2025-02-19 ENCOUNTER — OFFICE VISIT (OUTPATIENT)
Dept: CARDIOLOGY CLINIC | Age: 60
End: 2025-02-19
Payer: COMMERCIAL

## 2025-02-19 VITALS
RESPIRATION RATE: 18 BRPM | WEIGHT: 221.4 LBS | BODY MASS INDEX: 31.7 KG/M2 | DIASTOLIC BLOOD PRESSURE: 78 MMHG | SYSTOLIC BLOOD PRESSURE: 118 MMHG | HEIGHT: 70 IN | HEART RATE: 64 BPM

## 2025-02-19 DIAGNOSIS — I25.10 CAD IN NATIVE ARTERY: Primary | ICD-10-CM

## 2025-02-19 PROCEDURE — 99213 OFFICE O/P EST LOW 20 MIN: CPT | Performed by: INTERNAL MEDICINE

## 2025-02-19 PROCEDURE — 93000 ELECTROCARDIOGRAM COMPLETE: CPT | Performed by: INTERNAL MEDICINE

## 2025-02-19 NOTE — PROGRESS NOTES
TABLET BY MOUTH DAILY 90 tablet 0    atorvastatin (LIPITOR) 80 MG tablet Take 1 tablet by mouth nightly 90 tablet 3    aspirin 81 MG EC tablet Take 1 tablet by mouth daily 30 tablet 3     No current facility-administered medications for this visit.         Allergies as of 2025    (No Known Allergies)       Social History     Socioeconomic History    Marital status:      Spouse name: Not on file    Number of children: Not on file    Years of education: Not on file    Highest education level: Not on file   Occupational History    Not on file   Tobacco Use    Smoking status: Former     Current packs/day: 0.00     Average packs/day: 1.5 packs/day for 21.1 years (31.7 ttl pk-yrs)     Types: Cigarettes     Start date: 1984     Quit date: 2005     Years since quittin.7    Smokeless tobacco: Never    Tobacco comments:     Quit   Substance and Sexual Activity    Alcohol use: Yes     Alcohol/week: 10.0 standard drinks of alcohol     Types: 10 Cans of beer per week     Comment: weekly    Drug use: No    Sexual activity: Yes     Partners: Female     Comment:    Other Topics Concern    Not on file   Social History Narrative    Not on file     Social Determinants of Health     Financial Resource Strain: Patient Declined (2024)    Overall Financial Resource Strain (CARDIA)     Difficulty of Paying Living Expenses: Patient declined   Food Insecurity: Food Insecurity Present (2/10/2025)    Hunger Vital Sign     Worried About Running Out of Food in the Last Year: Never true     Ran Out of Food in the Last Year: Sometimes true   Transportation Needs: No Transportation Needs (2/10/2025)    PRAPARE - Transportation     Lack of Transportation (Medical): No     Lack of Transportation (Non-Medical): No   Physical Activity: Sufficiently Active (2024)    Exercise Vital Sign     Days of Exercise per Week: 6 days     Minutes of Exercise per Session: 60 min   Stress: Not on file   Social Connections:

## 2025-05-05 RX ORDER — CLOPIDOGREL BISULFATE 75 MG/1
75 TABLET ORAL DAILY
Qty: 90 TABLET | Refills: 0 | Status: SHIPPED | OUTPATIENT
Start: 2025-05-05

## 2025-05-13 ENCOUNTER — OFFICE VISIT (OUTPATIENT)
Dept: INTERNAL MEDICINE CLINIC | Age: 60
End: 2025-05-13

## 2025-05-13 VITALS
SYSTOLIC BLOOD PRESSURE: 122 MMHG | DIASTOLIC BLOOD PRESSURE: 82 MMHG | HEIGHT: 70 IN | TEMPERATURE: 97.9 F | HEART RATE: 105 BPM | OXYGEN SATURATION: 99 % | WEIGHT: 223 LBS | BODY MASS INDEX: 31.92 KG/M2

## 2025-05-13 DIAGNOSIS — R20.0 NUMBNESS AND TINGLING OF RIGHT UPPER EXTREMITY: Primary | ICD-10-CM

## 2025-05-13 DIAGNOSIS — I25.119 CORONARY ARTERY DISEASE INVOLVING NATIVE CORONARY ARTERY OF NATIVE HEART WITH ANGINA PECTORIS: ICD-10-CM

## 2025-05-13 DIAGNOSIS — R20.2 NUMBNESS AND TINGLING OF RIGHT UPPER EXTREMITY: Primary | ICD-10-CM

## 2025-05-13 DIAGNOSIS — E55.9 VITAMIN D DEFICIENCY: ICD-10-CM

## 2025-05-13 DIAGNOSIS — E78.00 PURE HYPERCHOLESTEROLEMIA: ICD-10-CM

## 2025-05-13 DIAGNOSIS — R73.03 PRE-DIABETES: ICD-10-CM

## 2025-05-13 LAB — HBA1C MFR BLD: 5.6 %

## 2025-05-13 RX ORDER — PREDNISONE 20 MG/1
20 TABLET ORAL 2 TIMES DAILY
Qty: 14 TABLET | Refills: 0 | Status: SHIPPED | OUTPATIENT
Start: 2025-05-13 | End: 2025-05-20

## 2025-05-13 RX ORDER — PREDNISONE 20 MG/1
20 TABLET ORAL 2 TIMES DAILY
Qty: 10 TABLET | Refills: 0 | Status: SHIPPED | OUTPATIENT
Start: 2025-05-13 | End: 2025-05-13

## 2025-05-13 ASSESSMENT — ENCOUNTER SYMPTOMS
CHEST TIGHTNESS: 0
SHORTNESS OF BREATH: 0
CHOKING: 0
COUGH: 0
BACK PAIN: 0

## 2025-05-13 NOTE — PROGRESS NOTES
Rg Bain (:  1965) is a 59 y.o. male, Established patient, here for evaluation of the following chief complaint(s):  3 Month Follow-Up (Right hand is having pain the thumb.  Whole hand is having pain and burning in the middle of the night.)         Assessment & Plan  1. Right hand pain.  - The patient reports pain, numbness, and tingling in the right hand, which wakes him up at night and requires shaking for relief.  - Physical examination shows positive Phalen's and Tinel's test with no marked thenar atrophy noted in the right hand.  - An EMG study has been ordered to assess the severity of the condition. A prescription for prednisone, to be taken twice daily for one week, has been provided to alleviate inflammation and swelling. He is advised to take the medication with food.  - A cock-up splint has been recommended for nighttime use to prevent waking due to discomfort. Exercises have also been suggested to potentially improve his symptoms. If the EMG results indicate severe impingement, a referral to a hand surgeon will be considered.    2. Health Maintenance.  - His A1c level has improved from 5.9 in 2025 to 5.6 currently.  - Blood pressure is well-controlled at 122/82. Weight is stable at 223 pounds with a BMI of 32.00.  - Previous blood work on 10/31/2024 showed an LDL of 73, triglycerides of 101, and HDL of 27. Vitamin D level was 31.3 in 10/2024, and PSA was normal at 1.13 in 10/2024.  - Fasting blood work has been ordered to be completed within the next 1 to 2 weeks.    3. Cardiac issues.  - He reports no chest pain or shortness of breath. He gets winded if he runs.  - He saw his cardiologist, Dr. Brewster, in 2024 and is scheduled to see him again in 9 months to a year.  - No swelling or tenderness noted in the neck or carotid arteries. No swelling in the ankles or feet.    Follow-up  - The patient will follow up in 4 months.    Results  Labs   - A1c: 2025, 5.6   - A1c: 2025,

## 2025-05-20 ENCOUNTER — LAB (OUTPATIENT)
Dept: INTERNAL MEDICINE CLINIC | Age: 60
End: 2025-05-20
Payer: COMMERCIAL

## 2025-05-20 ENCOUNTER — RESULTS FOLLOW-UP (OUTPATIENT)
Dept: INTERNAL MEDICINE CLINIC | Age: 60
End: 2025-05-20

## 2025-05-20 DIAGNOSIS — R73.03 PRE-DIABETES: ICD-10-CM

## 2025-05-20 DIAGNOSIS — E78.00 PURE HYPERCHOLESTEROLEMIA: ICD-10-CM

## 2025-05-20 DIAGNOSIS — Z82.49 FAMILY HISTORY OF EARLY CAD: Primary | ICD-10-CM

## 2025-05-20 DIAGNOSIS — E55.9 VITAMIN D DEFICIENCY: ICD-10-CM

## 2025-05-20 DIAGNOSIS — I25.119 CORONARY ARTERY DISEASE INVOLVING NATIVE CORONARY ARTERY OF NATIVE HEART WITH ANGINA PECTORIS: ICD-10-CM

## 2025-05-20 LAB
ALBUMIN: 4.3 G/DL (ref 3.5–5.2)
ALP BLD-CCNC: 78 U/L (ref 40–129)
ALT SERPL-CCNC: 37 U/L (ref 0–50)
ANION GAP SERPL CALCULATED.3IONS-SCNC: 15 MMOL/L (ref 7–16)
AST SERPL-CCNC: 36 U/L (ref 0–50)
BASOPHILS ABSOLUTE: 0.05 K/UL (ref 0–0.2)
BASOPHILS RELATIVE PERCENT: 1 % (ref 0–2)
BILIRUB SERPL-MCNC: 0.7 MG/DL (ref 0–1.2)
BUN BLDV-MCNC: 16 MG/DL (ref 6–20)
CALCIUM SERPL-MCNC: 9.3 MG/DL (ref 8.6–10)
CHLORIDE BLD-SCNC: 100 MMOL/L (ref 98–107)
CHOLESTEROL, TOTAL: 126 MG/DL
CO2: 24 MMOL/L (ref 22–29)
CREAT SERPL-MCNC: 0.9 MG/DL (ref 0.7–1.2)
EOSINOPHILS ABSOLUTE: 0.26 K/UL (ref 0.05–0.5)
EOSINOPHILS RELATIVE PERCENT: 3 % (ref 0–6)
GFR, ESTIMATED: >90 ML/MIN/1.73M2
GLUCOSE BLD-MCNC: 91 MG/DL (ref 74–99)
HCT VFR BLD CALC: 45.5 % (ref 37–54)
HDLC SERPL-MCNC: 31 MG/DL
HEMOGLOBIN: 15.1 G/DL (ref 12.5–16.5)
IMMATURE GRANULOCYTES %: 1 % (ref 0–5)
IMMATURE GRANULOCYTES ABSOLUTE: 0.06 K/UL (ref 0–0.58)
LDL CHOLESTEROL: 68 MG/DL
LYMPHOCYTES ABSOLUTE: 1.96 K/UL (ref 1.5–4)
LYMPHOCYTES RELATIVE PERCENT: 23 % (ref 20–42)
MCH RBC QN AUTO: 31.7 PG (ref 26–35)
MCHC RBC AUTO-ENTMCNC: 33.2 G/DL (ref 32–34.5)
MCV RBC AUTO: 95.4 FL (ref 80–99.9)
MONOCYTES ABSOLUTE: 0.91 K/UL (ref 0.1–0.95)
MONOCYTES RELATIVE PERCENT: 11 % (ref 2–12)
NEUTROPHILS ABSOLUTE: 5.23 K/UL (ref 1.8–7.3)
NEUTROPHILS RELATIVE PERCENT: 62 % (ref 43–80)
PDW BLD-RTO: 14.2 % (ref 11.5–15)
PLATELET # BLD: 223 K/UL (ref 130–450)
PMV BLD AUTO: 10.7 FL (ref 7–12)
POTASSIUM SERPL-SCNC: 4.1 MMOL/L (ref 3.5–5.1)
RBC # BLD: 4.77 M/UL (ref 3.8–5.8)
SODIUM BLD-SCNC: 138 MMOL/L (ref 136–145)
TOTAL PROTEIN: 6.9 G/DL (ref 6.4–8.3)
TRIGL SERPL-MCNC: 136 MG/DL
VITAMIN D 25-HYDROXY: 28 NG/ML (ref 30–100)
VLDLC SERPL CALC-MCNC: 27 MG/DL
WBC # BLD: 8.5 K/UL (ref 4.5–11.5)

## 2025-05-20 PROCEDURE — 36415 COLL VENOUS BLD VENIPUNCTURE: CPT | Performed by: NEUROMUSCULOSKELETAL MEDICINE & OMM

## 2025-05-21 NOTE — RESULT ENCOUNTER NOTE
Let patient know I reviewed the following blood work:    Vitamin D level low at 28.0, would recommend vitamin D3 at 4000 IUs daily.  Recheck vitamin D level in 3 months.    Lipid panel shows LDL 68, triglycerides 136, and HDL 31.  No change in medical regimen based on lipid panel results.    CBC with differential unremarkable.    CMP unremarkable.

## 2025-05-29 ENCOUNTER — PROCEDURE VISIT (OUTPATIENT)
Dept: PHYSICAL MEDICINE AND REHAB | Age: 60
End: 2025-05-29

## 2025-05-29 VITALS — BODY MASS INDEX: 31.5 KG/M2 | WEIGHT: 220 LBS | HEIGHT: 70 IN

## 2025-05-29 DIAGNOSIS — R20.0 NUMBNESS AND TINGLING OF RIGHT UPPER EXTREMITY: ICD-10-CM

## 2025-05-29 DIAGNOSIS — G56.01 RIGHT CARPAL TUNNEL SYNDROME: Primary | ICD-10-CM

## 2025-05-29 DIAGNOSIS — R20.2 NUMBNESS AND TINGLING OF RIGHT UPPER EXTREMITY: ICD-10-CM

## 2025-05-29 NOTE — PATIENT INSTRUCTIONS
Electrodiagnotic Laboratory  [facility] the [facility] with Ex[facility]  ALEXANDRE Ca D.O.   [facility]  [address] [place] [address]  Phone: [phone]  Fax: [phone]        Today you had an electrodiagnostic exam which included nerve conduction studies (NCS) and electromyography (EMG). This test evaluated the electrical activity of your nerves and muscles to help determine if you have a nerve or muscle disease.  This test can help determine the location and type of a nerve or muscle problem. This will help your referring doctor diagnose your condition and determine the appropriate next step in your treatment plan.     After your test:    1. There are no long lasting side effects of the test.     2. You may resume your normal activities without restrictions.     3.  Resume any medications that were stopped for the test.     4  If you have sore areas or bruising in your muscles where the needle was placed, apply a cold pack to the sore area for 15-20 minutes three to four times a day as needed for pain.  The soreness should go away in about 1-2 days.     5. Your results were provided  Briefly at the end of your test and the final detailed report will be provided to your referring physician, and/or primary care physician and any other parties you requested within 1-2 days of the examination. You may wish to contact your referring provider after a few days to determine what they would like you to do next.     6.  Please call [phone] with any questions or concerns and if you develop increased body temperature/fever, swelling, tenderness, increased pain and/or drainage from the sites where the needle was placed.     Thank you for choosing us for your health care needs.

## 2025-05-29 NOTE — PROGRESS NOTES
[facility]  Electrodiagnostic Laboratory  *Accredited by the [facility] with exemplary status  [address] [place] [address]  Phone: [phone]  Fax: [phone]    Referring Provider: Samuel Quintero DO  Primary Care Physician: Samuel Quintero DO  Patient Name: Rg Bain  Patient YOB: 1965  Gender: male  BMI: Body mass index is 31.57 kg/m².  Height 1.778 m (5' 10\"), weight 99.8 kg (220 lb).    5/29/2025    Reason for Referral: Numbness and tingling    Description of clinical problem:   Chief Complaint   Patient presents with    Extremity Pain     Right arm is achy at night. Waking him up at night with shoulder pain and arm is aching. 4-5 months of symp.     Numbness     Numbness/tingling in the first two fingers and th thumb at times.     Extremity Weakness     Dropping items.        Brief physical exam:   Sensory deficit Yes- decreased sensation LT right median distribution; Weakness No; Atrophy  No    Sensory NCS      Nerve / Sites Rec. Site Peak Lat PP Amp Segments Distance Velocity Temp.     ms µV  cm m/s °C   R Median - Digit II (Antidromic)      Palm Dig II 3.02 5.5 Palm - Dig II 7 29 32.7      Wrist Dig II NR NR Wrist - Dig II 14 NR 32.7   R Ulnar - Digit V (Antidromic)      Wrist Dig V 3.02 29.9 Wrist - Dig V 14 61 32.9   R Radial - Anatomical snuff box (Forearm)      Forearm Wrist 1.72 13.7 Forearm - Wrist 10 74 32.5       Motor NCS      Nerve / Sites Muscle Onset Amplitude Segments Distance Velocity Temp.     ms mV  cm m/s °C   R Median - APB      Palm APB 2.08 9.6 Palm - APB   32.9      Wrist APB 6.30 7.1 Wrist - Palm 8 19 32.9      Elbow APB 11.51 5.1 Elbow - Wrist 21 40 32.8   R Ulnar - ADM      Wrist ADM 2.76 10.4 Wrist - ADM 8  32.8      B.Elbow ADM 6.30 10.4 B.Elbow - Wrist 21 59 32.8      A.Elbow ADM 7.81 10.1 A.Elbow - B.Elbow 10 66 32.8       F Wave      Nerve Fmin % F    ms %   R Median - APB 34.48 100   R Ulnar - ADM 28.33 80

## 2025-06-02 RX ORDER — ATORVASTATIN CALCIUM 80 MG/1
80 TABLET, FILM COATED ORAL NIGHTLY
Qty: 90 TABLET | Refills: 3 | Status: SHIPPED | OUTPATIENT
Start: 2025-06-02

## 2025-08-01 RX ORDER — CLOPIDOGREL BISULFATE 75 MG/1
75 TABLET ORAL DAILY
Qty: 90 TABLET | Refills: 3 | Status: SHIPPED | OUTPATIENT
Start: 2025-08-01

## 2025-08-14 ENCOUNTER — TELEPHONE (OUTPATIENT)
Dept: CARDIOLOGY CLINIC | Age: 60
End: 2025-08-14

## 2025-08-20 ENCOUNTER — TELEPHONE (OUTPATIENT)
Dept: CARDIOLOGY CLINIC | Age: 60
End: 2025-08-20